# Patient Record
Sex: FEMALE | Race: WHITE | NOT HISPANIC OR LATINO | Employment: FULL TIME | URBAN - METROPOLITAN AREA
[De-identification: names, ages, dates, MRNs, and addresses within clinical notes are randomized per-mention and may not be internally consistent; named-entity substitution may affect disease eponyms.]

---

## 2019-03-26 ENCOUNTER — OFFICE VISIT (OUTPATIENT)
Dept: FAMILY MEDICINE CLINIC | Facility: CLINIC | Age: 39
End: 2019-03-26
Payer: COMMERCIAL

## 2019-03-26 VITALS
SYSTOLIC BLOOD PRESSURE: 132 MMHG | WEIGHT: 293 LBS | DIASTOLIC BLOOD PRESSURE: 80 MMHG | HEIGHT: 65 IN | BODY MASS INDEX: 48.82 KG/M2 | HEART RATE: 100 BPM | OXYGEN SATURATION: 98 %

## 2019-03-26 DIAGNOSIS — Z13.9 SCREENING FOR CONDITION: ICD-10-CM

## 2019-03-26 DIAGNOSIS — E66.01 CLASS 3 SEVERE OBESITY WITHOUT SERIOUS COMORBIDITY WITH BODY MASS INDEX (BMI) OF 50.0 TO 59.9 IN ADULT, UNSPECIFIED OBESITY TYPE (HCC): Primary | ICD-10-CM

## 2019-03-26 DIAGNOSIS — F32.A DEPRESSION, UNSPECIFIED DEPRESSION TYPE: ICD-10-CM

## 2019-03-26 PROBLEM — E66.813 CLASS 3 SEVERE OBESITY WITHOUT SERIOUS COMORBIDITY WITH BODY MASS INDEX (BMI) OF 50.0 TO 59.9 IN ADULT (HCC): Status: ACTIVE | Noted: 2019-03-26

## 2019-03-26 PROCEDURE — 1036F TOBACCO NON-USER: CPT | Performed by: FAMILY MEDICINE

## 2019-03-26 PROCEDURE — 3008F BODY MASS INDEX DOCD: CPT | Performed by: FAMILY MEDICINE

## 2019-03-26 PROCEDURE — 99203 OFFICE O/P NEW LOW 30 MIN: CPT | Performed by: FAMILY MEDICINE

## 2019-03-26 RX ORDER — DROSPIRENONE AND ETHINYL ESTRADIOL 0.03MG-3MG
1 KIT ORAL DAILY
COMMUNITY
End: 2019-09-24 | Stop reason: HOSPADM

## 2019-03-26 NOTE — PROGRESS NOTES
[facility]        NAME: Nimisha Cross is a 41 y o  female  : 1980    MRN: [id number]  DATE: 2019  TIME: 8:48 AM    Assessment and Plan   Class 3 severe obesity without serious comorbidity with body mass index (BMI) of 50 0 to 59 9 in adult, unspecified obesity type ([facility] [address] ) [E66 01, Z68 43]  1  Class 3 severe obesity without serious comorbidity with body mass index (BMI) of 50 0 to 59 9 in adult, unspecified obesity type ([facility] [address] )     2  Depression, unspecified depression type           Patient Instructions     Patient Instructions   Pt failed mult diets and diet meds---refer for eval for bariatric surg          Chief Complaint     Chief Complaint   Patient presents with    New Pt     est care-- order labs         History of Present Illness       Annual exam--wants to discuss weight      Review of Systems   Review of Systems   Constitutional: Negative for fatigue, fever and unexpected weight change  HENT: Negative for congestion, sinus pain and sore throat  Eyes: Negative for visual disturbance  Respiratory: Negative for shortness of breath and wheezing  Cardiovascular: Negative for chest pain and palpitations  Gastrointestinal: Negative for abdominal pain, nausea and vomiting  Musculoskeletal: Negative  Negative for arthralgias and myalgias  Neurological: Negative for syncope, weakness and numbness  Psychiatric/Behavioral: Negative  Negative for confusion, dysphoric mood and suicidal ideas           Current Medications       Current Outpatient Medications:     drospirenone-ethinyl estradiol (EVY 28) 3-0 03 MG per tablet, Take 1 tablet by mouth daily, Disp: , Rfl:     vortioxetine (TRINTELLIX) 10 MG tablet, Take 10 mg by mouth daily, Disp: , Rfl:     Current Allergies     Allergies as of 2019 - never reviewed   Allergen Reaction Noted    Sulfa antibiotics Hives 2019            The following portions of the patient's history were reviewed and updated as appropriate: allergies, current medications, past family history, past medical history, past social history, past surgical history and problem list      Past Medical History:   Diagnosis Date    Anxiety     Depression        History reviewed  No pertinent surgical history  Family History   Problem Relation Age of Onset    Hypertension Mother          Medications have been verified  Objective   /80   Pulse 100   Ht 5' 5" (1 651 m)   Wt (!) 146 kg (321 lb)   LMP 03/09/2019 (Exact Date)   SpO2 98%   BMI 53 42 kg/m²        Physical Exam     Physical Exam   Constitutional: She is oriented to person, place, and time  Vital signs are normal  She appears well-developed and well-nourished  HENT:   Right Ear: Ear canal normal  Tympanic membrane is not injected  Left Ear: Ear canal normal  Tympanic membrane is not injected  Nose: Nose normal    Mouth/Throat: Oropharynx is clear and moist    Eyes: Pupils are equal, round, and reactive to light  Conjunctivae and EOM are normal  Right eye exhibits no discharge  Left eye exhibits no discharge  Neck: Normal range of motion  Neck supple  No thyromegaly present  Cardiovascular: Normal rate, regular rhythm and normal heart sounds  No murmur heard  Pulmonary/Chest: Effort normal and breath sounds normal  No respiratory distress  She has no wheezes  Abdominal: Soft  Bowel sounds are normal  She exhibits no distension  There is no tenderness  Musculoskeletal: Normal range of motion  Lymphadenopathy:     She has no cervical adenopathy  Neurological: She is alert and oriented to person, place, and time  She has normal strength and normal reflexes  She is not disoriented  No sensory deficit  Gait normal    Skin: Skin is warm and dry  Psychiatric: She has a normal mood and affect   Her speech is normal and behavior is normal  Judgment and thought content normal  Cognition and memory are normal

## 2019-03-29 ENCOUNTER — TELEPHONE (OUTPATIENT)
Dept: FAMILY MEDICINE CLINIC | Facility: CLINIC | Age: 39
End: 2019-03-29

## 2019-03-29 LAB
ALBUMIN SERPL-MCNC: 4.5 G/DL (ref 3.5–5.5)
ALBUMIN/GLOB SERPL: 1.5 {RATIO} (ref 1.2–2.2)
ALP SERPL-CCNC: 56 IU/L (ref 39–117)
ALT SERPL-CCNC: 17 IU/L (ref 0–32)
AST SERPL-CCNC: 17 IU/L (ref 0–40)
BILIRUB SERPL-MCNC: 0.3 MG/DL (ref 0–1.2)
BUN SERPL-MCNC: 6 MG/DL (ref 6–20)
BUN/CREAT SERPL: 9 (ref 9–23)
CALCIUM SERPL-MCNC: 9.5 MG/DL (ref 8.7–10.2)
CHLORIDE SERPL-SCNC: 101 MMOL/L (ref 96–106)
CHOLEST SERPL-MCNC: 151 MG/DL (ref 100–199)
CO2 SERPL-SCNC: 21 MMOL/L (ref 20–29)
CREAT SERPL-MCNC: 0.66 MG/DL (ref 0.57–1)
GLOBULIN SER-MCNC: 3.1 G/DL (ref 1.5–4.5)
GLUCOSE SERPL-MCNC: 91 MG/DL (ref 65–99)
HDLC SERPL-MCNC: 55 MG/DL
LDLC SERPL CALC-MCNC: 75 MG/DL (ref 0–99)
LDLC/HDLC SERPL: 1.4 RATIO (ref 0–3.2)
POTASSIUM SERPL-SCNC: 4 MMOL/L (ref 3.5–5.2)
PROT SERPL-MCNC: 7.6 G/DL (ref 6–8.5)
SL AMB EGFR AFRICAN AMERICAN: 131 ML/MIN/1.73
SL AMB EGFR NON AFRICAN AMERICAN: 113 ML/MIN/1.73
SL AMB VLDL CHOLESTEROL CALC: 21 MG/DL (ref 5–40)
SODIUM SERPL-SCNC: 138 MMOL/L (ref 134–144)
T4 FREE SERPL-MCNC: 1.13 NG/DL (ref 0.82–1.77)
TRIGL SERPL-MCNC: 107 MG/DL (ref 0–149)
TSH SERPL DL<=0.005 MIU/L-ACNC: 2.59 UIU/ML (ref 0.45–4.5)

## 2019-05-11 DIAGNOSIS — F41.9 ANXIETY: Primary | ICD-10-CM

## 2019-05-20 ENCOUNTER — TELEPHONE (OUTPATIENT)
Dept: FAMILY MEDICINE CLINIC | Facility: CLINIC | Age: 39
End: 2019-05-20

## 2019-05-29 ENCOUNTER — CLINICAL SUPPORT (OUTPATIENT)
Dept: BARIATRICS | Facility: CLINIC | Age: 39
End: 2019-05-29

## 2019-05-29 VITALS
HEART RATE: 98 BPM | TEMPERATURE: 98.9 F | BODY MASS INDEX: 48.82 KG/M2 | SYSTOLIC BLOOD PRESSURE: 124 MMHG | HEIGHT: 65 IN | DIASTOLIC BLOOD PRESSURE: 82 MMHG | WEIGHT: 293 LBS

## 2019-05-29 DIAGNOSIS — E66.01 MORBID (SEVERE) OBESITY DUE TO EXCESS CALORIES (HCC): Primary | ICD-10-CM

## 2019-05-29 DIAGNOSIS — E66.01 MORBID OBESITY (HCC): Primary | ICD-10-CM

## 2019-05-29 PROCEDURE — RECHECK

## 2019-06-17 ENCOUNTER — TELEPHONE (OUTPATIENT)
Dept: FAMILY MEDICINE CLINIC | Facility: CLINIC | Age: 39
End: 2019-06-17

## 2019-06-19 ENCOUNTER — OFFICE VISIT (OUTPATIENT)
Dept: BARIATRICS | Facility: CLINIC | Age: 39
End: 2019-06-19
Payer: COMMERCIAL

## 2019-06-19 VITALS
BODY MASS INDEX: 48.82 KG/M2 | WEIGHT: 293 LBS | SYSTOLIC BLOOD PRESSURE: 138 MMHG | HEIGHT: 65 IN | HEART RATE: 102 BPM | TEMPERATURE: 99.1 F | DIASTOLIC BLOOD PRESSURE: 90 MMHG

## 2019-06-19 DIAGNOSIS — E66.01 OBESITY, CLASS III, BMI 40-49.9 (MORBID OBESITY) (HCC): Primary | ICD-10-CM

## 2019-06-19 PROCEDURE — 99203 OFFICE O/P NEW LOW 30 MIN: CPT | Performed by: SURGERY

## 2019-06-19 NOTE — H&P (VIEW-ONLY)
BARIATRIC CONSULT-INITIAL - BARIATRIC SURGERY  Fawn Bowens 41 y o  female MRN: [id number]  Unit/Bed#:  Encounter: [id number]      HPI:  Fawn Bowens is a 41 y o  female who presents with morbid obesity to discuss weight loss options  Review of Systems   Constitutional: Negative  HENT: Negative  Eyes: Negative  Respiratory: Negative  Cardiovascular: Negative  Gastrointestinal: Negative  Endocrine: Negative  Genitourinary: Negative  Musculoskeletal: Negative  Skin: Negative  Neurological: Negative  Hematological: Negative  Psychiatric/Behavioral: Negative  Historical Information   Past Medical History:   Diagnosis Date    Anxiety     Depression     Morbid obesity ([address] )      Past Surgical History:   Procedure Laterality Date    OVARIAN CYST REMOVAL      WISDOM TOOTH EXTRACTION       Social History   Social History     Substance and Sexual Activity   Alcohol Use Yes    Frequency: Monthly or less    Comment: rarely drink     Social History     Substance and Sexual Activity   Drug Use Not Currently    Types: Marijuana     Social History     Tobacco Use   Smoking Status Former Smoker    Packs/day: 0 50    Years: 10 00    Pack years: 5 00    Types: Cigarettes    Last attempt to quit: 2013    Years since quittin 9   Smokeless Tobacco Never Used     Family History: non-contributory    Meds/Allergies   all medications and allergies reviewed  Allergies   Allergen Reactions    Sulfa Antibiotics Hives       Objective       Current Vitals:   Blood Pressure: 138/90 (19 1535)  Pulse: 102 (19 1535)  Temperature: 99 1 °F (37 3 °C) (19 1535)  Temp Source: Tympanic (19 1535)  Height: 5' 4 5" (163 8 cm) (19 1535)  Weight - Scale: (!) 143 kg (315 lb 8 oz) (19 1535)  Body mass index is 53 32 kg/m²  Invasive Devices     None                 Physical Exam   Constitutional: She appears well-developed and well-nourished  Lab Results: I have personally reviewed pertinent lab results  Imaging: I have personally reviewed pertinent reports  EKG, Pathology, and Other Studies: I have personally reviewed pertinent reports  Code Status: [unfilled]  Advance Directive and Living Will:      Power of :    POLST:      Assessment/PLAN:            Patient has a long history of morbid obesity and is presenting to discuss the surgical weight loss options  Despite the patient best efforts patient was unable to lose any meaningful or sustainable weight using nonsurgical means  We had a long discussion regarding all the surgical weight-loss options at our disposal at this point and reviewed the risks and benefits of each procedure in details as it relates to her age, BMI and medical conditions  Patient elected to undergo a gastric bypass    Risks and benefits were explained to the patient  We also discussed the importance and need of a preoperative workup to make sure that the patient can undergo the procedure safely  Preoperative workup includes sleep apnea screening, cardiac evaluation, nutrition/psych and preoperative EGD  Risks and benefits of all the preoperative diagnostic tests were discussed with the patient including but not limited to the upper endoscopy  Alternatives to surgery and alternative forms of surgery were also explained  Postsurgical commitment and aftercare programs were discussed and explained to the patient in details   In terms of comorbidities patient suffers mostly of   Past Medical History:   Diagnosis Date    Anxiety     Depression     Morbid obesity ([facility] [address] )        I informed the patient that the rate of resolution of comorbid conditions following weight loss surgery is between 60 and 90% depending on the severity of the specific medical condition  I discussed and educated the patient regarding the different components of our multidisciplinary program and the importance of compliance and follow-up in the postoperative period  All questions answered  Patient understands risks and benefits  A videotape of the procedure was also shown to the patient  After showing the video we discussed all the technical aspects of the procedure and also the potential complications including but not limited to gastrointestinal perforation, leak, obstruction, stricture and hemorrhage  I spent 30 min with the patient more than 50% of the time was spent educating the patient and coordinating care

## 2019-06-27 ENCOUNTER — OFFICE VISIT (OUTPATIENT)
Dept: BARIATRICS | Facility: CLINIC | Age: 39
End: 2019-06-27

## 2019-06-27 ENCOUNTER — TELEPHONE (OUTPATIENT)
Dept: FAMILY MEDICINE CLINIC | Facility: CLINIC | Age: 39
End: 2019-06-27

## 2019-06-27 VITALS — BODY MASS INDEX: 53.32 KG/M2 | WEIGHT: 293 LBS

## 2019-06-27 DIAGNOSIS — E66.01 CLASS 3 SEVERE OBESITY WITHOUT SERIOUS COMORBIDITY WITH BODY MASS INDEX (BMI) OF 50.0 TO 59.9 IN ADULT, UNSPECIFIED OBESITY TYPE (HCC): Primary | ICD-10-CM

## 2019-06-27 PROCEDURE — RECHECK

## 2019-07-03 ENCOUNTER — CONSULT (OUTPATIENT)
Dept: CARDIOLOGY CLINIC | Facility: CLINIC | Age: 39
End: 2019-07-03
Payer: COMMERCIAL

## 2019-07-03 VITALS
SYSTOLIC BLOOD PRESSURE: 138 MMHG | HEART RATE: 71 BPM | WEIGHT: 293 LBS | BODY MASS INDEX: 48.82 KG/M2 | DIASTOLIC BLOOD PRESSURE: 80 MMHG | HEIGHT: 65 IN

## 2019-07-03 DIAGNOSIS — Z01.810 PREOP CARDIOVASCULAR EXAM: ICD-10-CM

## 2019-07-03 DIAGNOSIS — E66.01 CLASS 3 SEVERE OBESITY WITHOUT SERIOUS COMORBIDITY WITH BODY MASS INDEX (BMI) OF 50.0 TO 59.9 IN ADULT, UNSPECIFIED OBESITY TYPE (HCC): Primary | ICD-10-CM

## 2019-07-03 DIAGNOSIS — E66.01 MORBID OBESITY (HCC): ICD-10-CM

## 2019-07-03 PROCEDURE — 99203 OFFICE O/P NEW LOW 30 MIN: CPT | Performed by: INTERNAL MEDICINE

## 2019-07-03 PROCEDURE — 93000 ELECTROCARDIOGRAM COMPLETE: CPT | Performed by: INTERNAL MEDICINE

## 2019-07-03 NOTE — PROGRESS NOTES
Cardiology Follow Up    Yasir Poe  1980  [id number]  [address] [facility] Chris Medley  [address]  [place] [facility]   [phone]    1  Class 3 severe obesity without serious comorbidity with body mass index (BMI) of 50 0 to 59 9 in adult, unspecified obesity type ([place] [address] )     2  Preop cardiovascular exam  POCT ECG    Stress test only, exercise   3  Morbid obesity ([facility] )  Ambulatory referral to Cardiology       Interval History:  Preop cardiac clearance for bariatric surgery  Pleasant 67-year-old female who has no previous cardiac history, overweight most of her adult life  Currently having no cardiac symptoms including chest pain or dyspnea, mildly active  No other risk factors      Patient Active Problem List   Diagnosis    Depression    Class 3 severe obesity without serious comorbidity with body mass index (BMI) of 50 0 to 59 9 in adult [facility])    Preop cardiovascular exam     Past Medical History:   Diagnosis Date    Anxiety     Depression     Morbid obesity ([place] [address] )      Social History     Socioeconomic History    Marital status: /Civil Union     Spouse name: Not on file    Number of children: Not on file    Years of education: Not on file    Highest education level: Not on file   Occupational History    Not on file   Social Needs    Financial resource strain: Not on file    Food insecurity:     Worry: Not on file     Inability: Not on file    Transportation needs:     Medical: Not on file     Non-medical: Not on file   Tobacco Use    Smoking status: Former Smoker     Packs/day: 0 50     Years: 10 00     Pack years: 5 00     Types: Cigarettes     Last attempt to quit: 2013     Years since quittin 0    Smokeless tobacco: Never Used   Substance and Sexual Activity    Alcohol use: Yes     Frequency: Monthly or less     Comment: rarely drink    Drug use: Not Currently     Types: Marijuana    Sexual activity: Yes     Partners: Male     Birth control/protection: OCP   Lifestyle    Physical activity:     Days per week: Not on file     Minutes per session: Not on file    Stress: Not on file   Relationships    Social connections:     Talks on phone: Not on file     Gets together: Not on file     Attends Rastafarian service: Not on file     Active member of club or organization: Not on file     Attends meetings of clubs or organizations: Not on file     Relationship status: Not on file    Intimate partner violence:     Fear of current or ex partner: Not on file     Emotionally abused: Not on file     Physically abused: Not on file     Forced sexual activity: Not on file   Other Topics Concern    Not on file   Social History Narrative    Not on file      Family History   Problem Relation Age of Onset    Hypertension Mother     Obesity Mother     Obesity Father     Depression Father    Negro Debra Sister     No Known Problems Brother     Drug abuse Cousin     Stroke Maternal Aunt      Past Surgical History:   Procedure Laterality Date    OVARIAN CYST REMOVAL      WISDOM TOOTH EXTRACTION         Current Outpatient Medications:     CRANBERRY PO, Take by mouth 2 (two) times a day, Disp: , Rfl:     drospirenone-ethinyl estradiol (EVY 28) 3-0 03 MG per tablet, Take 1 tablet by mouth daily, Disp: , Rfl:     Multiple Vitamins-Minerals (WOMENS MULTI PO), , Disp: , Rfl:     Probiotic Product (PROBIOTIC PO), Take by mouth, Disp: , Rfl:     vortioxetine (TRINTELLIX) 10 MG tablet, Take 1 tablet (10 mg total) by mouth daily, Disp: 90 tablet, Rfl: 1  Allergies   Allergen Reactions    Sulfa Antibiotics Hives       Labs:  Office Visit on 03/26/2019   Component Date Value    Free t4 03/28/2019 1 13     TSH 03/28/2019 2 590     Glucose, Random 03/28/2019 91     BUN 03/28/2019 6     Creatinine 03/28/2019 0 66     eGFR Non  03/28/2019 113     eGFR  03/28/2019 131     SL AMB BUN/CREATININE RA* 03/28/2019 9     Sodium 03/28/2019 138     Potassium 03/28/2019 4 0     Chloride 03/28/2019 101     CO2 03/28/2019 21     CALCIUM 03/28/2019 9 5     Protein, Total 03/28/2019 7 6     Albumin 03/28/2019 4 5     Globulin, Total 03/28/2019 3 1     Albumin/Globulin Ratio 03/28/2019 1 5     TOTAL BILIRUBIN 03/28/2019 0 3     Alk Phos Isoenzymes 03/28/2019 56     AST 03/28/2019 17     ALT 03/28/2019 17     Cholesterol, Total 03/28/2019 151     Triglycerides 03/28/2019 107     HDL 03/28/2019 55     VLDL Cholesterol Calcula* 03/28/2019 21     LDL Direct 03/28/2019 75     LDl/HDL Ratio 03/28/2019 1 4      Imaging: No results found  Review of Systems:  Review of Systems   Constitutional: Negative for activity change, diaphoresis, fatigue, fever and unexpected weight change  HENT: Negative for hearing loss, nosebleeds and trouble swallowing  Eyes: Negative for visual disturbance  Respiratory: Negative for apnea, cough, choking, chest tightness, shortness of breath, wheezing and stridor  Cardiovascular: Negative for chest pain, palpitations and leg swelling  Gastrointestinal: Negative for abdominal distention, abdominal pain, anal bleeding, blood in stool, constipation, diarrhea, nausea, rectal pain and vomiting  Endocrine: Negative for cold intolerance and heat intolerance  Genitourinary: Negative for difficulty urinating, dysuria, flank pain, frequency, hematuria and urgency  Musculoskeletal: Positive for back pain  Negative for arthralgias, gait problem, joint swelling and myalgias  Skin: Negative for color change, pallor, rash and wound  Allergic/Immunologic: Negative for immunocompromised state  Neurological: Negative for dizziness, tremors, seizures, syncope, facial asymmetry, speech difficulty, weakness, light-headedness, numbness and headaches  Hematological: Does not bruise/bleed easily     Psychiatric/Behavioral: Negative for behavioral problems, decreased concentration and sleep disturbance  The patient is nervous/anxious  Physical Exam:  Physical Exam   Constitutional: She is oriented to person, place, and time  She appears well-developed  No distress  Eyes: No scleral icterus  Neck: No JVD present  No tracheal deviation present  No thyromegaly present  Cardiovascular: Normal rate, regular rhythm, normal heart sounds and intact distal pulses  Exam reveals no gallop and no friction rub  No murmur heard  Pulmonary/Chest: Effort normal and breath sounds normal  No stridor  No respiratory distress  She has no wheezes  She has no rales  She exhibits no tenderness  Abdominal: Soft  Bowel sounds are normal    Musculoskeletal: She exhibits no edema  Neurological: She is alert and oriented to person, place, and time  Skin: Skin is warm and dry  Capillary refill takes less than 2 seconds  She is not diaphoretic  No erythema  No pallor  Psychiatric: She has a normal mood and affect  Her behavior is normal  Judgment and thought content normal        Discussion/Summary:  Preoperative cardiac clearance, unremarkable examination and EKG  Will do stress test to assess functional capacity  Anticipate to be unrevealing, clearance pending the results of the testing  This note was completed in part utilizing [employer] direct voice recognition software  Grammatical errors, random word insertion, spelling mistakes, and incomplete sentences may be an occasional consequence of the system secondary to software limitations, ambient noise and hardware issues  At the time of dictation, efforts were made to edit, clarify and /or correct errors  Please read the chart carefully and recognize, using context, where substitutions have occurred  If you have any questions or concerns about the context, text or information contained within the body of this dictation, please contact myself, the provider, for further clarification

## 2019-07-09 ENCOUNTER — ANESTHESIA EVENT (OUTPATIENT)
Dept: GASTROENTEROLOGY | Facility: HOSPITAL | Age: 39
End: 2019-07-09

## 2019-07-10 ENCOUNTER — HOSPITAL ENCOUNTER (OUTPATIENT)
Dept: GASTROENTEROLOGY | Facility: HOSPITAL | Age: 39
Setting detail: OUTPATIENT SURGERY
Discharge: HOME | End: 2019-07-10
Attending: SURGERY
Payer: COMMERCIAL

## 2019-07-10 ENCOUNTER — ANESTHESIA (OUTPATIENT)
Dept: GASTROENTEROLOGY | Facility: HOSPITAL | Age: 39
End: 2019-07-10

## 2019-07-10 VITALS
BODY MASS INDEX: 50.02 KG/M2 | OXYGEN SATURATION: 97 % | TEMPERATURE: 98.4 F | HEART RATE: 80 BPM | WEIGHT: 293 LBS | RESPIRATION RATE: 22 BRPM | SYSTOLIC BLOOD PRESSURE: 140 MMHG | DIASTOLIC BLOOD PRESSURE: 83 MMHG | HEIGHT: 64 IN

## 2019-07-10 DIAGNOSIS — E66.01 MORBID OBESITY (HCC): ICD-10-CM

## 2019-07-10 LAB
EXT PREGNANCY TEST URINE: NEGATIVE
EXT. CONTROL: NORMAL

## 2019-07-10 PROCEDURE — 81025 URINE PREGNANCY TEST: CPT | Performed by: ANESTHESIOLOGY

## 2019-07-10 PROCEDURE — 43239 EGD BIOPSY SINGLE/MULTIPLE: CPT | Performed by: SURGERY

## 2019-07-10 PROCEDURE — 88305 TISSUE EXAM BY PATHOLOGIST: CPT | Performed by: PATHOLOGY

## 2019-07-10 RX ORDER — SODIUM CHLORIDE 9 MG/ML
125 INJECTION, SOLUTION INTRAVENOUS CONTINUOUS
Status: DISCONTINUED | OUTPATIENT
Start: 2019-07-10 | End: 2019-07-14 | Stop reason: HOSPADM

## 2019-07-10 RX ORDER — PROPOFOL 10 MG/ML
INJECTION, EMULSION INTRAVENOUS AS NEEDED
Status: DISCONTINUED | OUTPATIENT
Start: 2019-07-10 | End: 2019-07-10 | Stop reason: SURG

## 2019-07-10 RX ADMIN — LIDOCAINE HYDROCHLORIDE 50 MG: 20 INJECTION, SOLUTION INTRAVENOUS at 09:54

## 2019-07-10 RX ADMIN — SODIUM CHLORIDE 125 ML/HR: 0.9 INJECTION, SOLUTION INTRAVENOUS at 09:12

## 2019-07-10 RX ADMIN — PROPOFOL 200 MG: 10 INJECTION, EMULSION INTRAVENOUS at 09:54

## 2019-07-10 RX ADMIN — PROPOFOL 50 MG: 10 INJECTION, EMULSION INTRAVENOUS at 09:56

## 2019-07-10 RX ADMIN — PROPOFOL 40 MG: 10 INJECTION, EMULSION INTRAVENOUS at 09:58

## 2019-07-10 NOTE — ANESTHESIA PREPROCEDURE EVALUATION
Review of Systems/Medical History  Patient summary reviewed  Chart reviewed  No history of anesthetic complications     Cardiovascular  Negative cardio ROS    Pulmonary  Negative pulmonary ROS        GI/Hepatic  Negative GI/hepatic ROS          Negative  ROS        Endo/Other    Obesity (53=BMI)  super morbid obesity   GYN  Negative gynecology ROS          Hematology  Negative hematology ROS      Musculoskeletal  Negative musculoskeletal ROS        Neurology  Negative neurology ROS      Psychology   Anxiety, Depression , being treated for depression,              Physical Exam    Airway    Mallampati score: III  TM Distance: >3 FB  Neck ROM: full     Dental   No notable dental hx     Cardiovascular  Comment: Negative ROS, Rhythm: regular, Rate: normal, Cardiovascular exam normal    Pulmonary  Pulmonary exam normal Breath sounds clear to auscultation,     Other Findings        Anesthesia Plan  ASA Score- 3     Anesthesia Type- IV sedation with anesthesia with ASA Monitors  Additional Monitors:   Airway Plan:         Plan Factors-Patient not instructed to abstain from smoking on day of procedure  Patient did not smoke on day of surgery  Induction- intravenous  Postoperative Plan-     Informed Consent- Anesthetic plan and risks discussed with patient  I personally reviewed this patient with the CRNA  Discussed and agreed on the Anesthesia Plan with the CRNA  Mabel Ingram

## 2019-07-10 NOTE — DISCHARGE INSTRUCTIONS
Upper Endoscopy   WHAT YOU NEED TO KNOW:   An upper endoscopy is also called an upper gastrointestinal (GI) endoscopy, or an esophagogastroduodenoscopy (EGD)  You may feel bloated, gassy, or have some abdominal discomfort after your procedure  Your throat may be sore for 24 to 36 hours  You may burp or pass gas from air that is still inside your body  DISCHARGE INSTRUCTIONS:   Call 911 if:   · You have sudden chest pain or trouble breathing  Seek care immediately if:   · You feel dizzy or faint  · You have trouble swallowing  · You have severe throat pain  · Your bowel movements are very dark or black  · Your abdomen is hard and firm and you have severe pain  · You vomit blood  Contact your healthcare provider if:   · You feel full or bloated and cannot burp or pass gas  · You have not had a bowel movement for 3 days after your procedure  · You have neck pain  · You have a fever or chills  · You have nausea or are vomiting  · You have a rash or hives  · You have questions or concerns about your endoscopy  Relieve a sore throat:  Suck on throat lozenges or crushed ice  Gargle with a small amount of warm salt water  Mix 1 teaspoon of salt and 1 cup of warm water to make salt water  Relieve gas and discomfort from bloating:  Lie on your right side with a heating pad on your abdomen  Take short walks to help pass gas  Eat small meals until bloating is relieved  Rest after your procedure:  Do not drive or make important decisions until the day after your procedure  Return to your normal activity as directed  You can usually return to work the day after your procedure  Follow up with your healthcare provider as directed:  Write down your questions so you remember to ask them during your visits  © 2017 [employer]  Information is for End User's use only and may not be sold, redistributed or otherwise used for commercial purposes   All illustrations and images included in [employer] are the copyrighted property of [employer] , [employer]  or [employer] Elder  The above information is an  only  It is not intended as medical advice for individual conditions or treatments  Talk to your doctor, nurse or pharmacist before following any medical regimen to see if it is safe and effective for you

## 2019-07-16 ENCOUNTER — HOSPITAL ENCOUNTER (OUTPATIENT)
Dept: NON INVASIVE DIAGNOSTICS | Facility: CLINIC | Age: 39
Discharge: HOME | End: 2019-07-16
Payer: COMMERCIAL

## 2019-07-16 ENCOUNTER — TELEPHONE (OUTPATIENT)
Dept: CARDIOLOGY CLINIC | Facility: CLINIC | Age: 39
End: 2019-07-16

## 2019-07-16 DIAGNOSIS — Z01.810 PREOP CARDIOVASCULAR EXAM: ICD-10-CM

## 2019-07-16 PROCEDURE — 93017 CV STRESS TEST TRACING ONLY: CPT

## 2019-07-16 PROCEDURE — 93016 CV STRESS TEST SUPVJ ONLY: CPT | Performed by: INTERNAL MEDICINE

## 2019-07-16 PROCEDURE — 93018 CV STRESS TEST I&R ONLY: CPT | Performed by: INTERNAL MEDICINE

## 2019-07-16 NOTE — TELEPHONE ENCOUNTER
----- Message from Charlie Lindsey MD sent at 7/16/2019  4:20 PM EDT -----  Please call the patient regarding her normal result    Please clear for surgery

## 2019-07-17 LAB
CHEST PAIN STATEMENT: NORMAL
MAX DIASTOLIC BP: 80 MMHG
MAX HEART RATE: 171 BPM
MAX PREDICTED HEART RATE: 183 BPM
MAX. SYSTOLIC BP: 190 MMHG
PROTOCOL NAME: NORMAL
TARGET HR FORMULA: NORMAL
TEST INDICATION: NORMAL
TIME IN EXERCISE PHASE: NORMAL

## 2019-08-06 DIAGNOSIS — E66.01 CLASS 3 SEVERE OBESITY WITHOUT SERIOUS COMORBIDITY WITH BODY MASS INDEX (BMI) OF 50.0 TO 59.9 IN ADULT, UNSPECIFIED OBESITY TYPE (HCC): Primary | ICD-10-CM

## 2019-08-06 DIAGNOSIS — Z01.812 BLOOD TESTS PRIOR TO TREATMENT OR PROCEDURE: ICD-10-CM

## 2019-08-08 PROBLEM — E66.01 MORBID OBESITY (HCC): Status: ACTIVE | Noted: 2019-08-08

## 2019-08-20 ENCOUNTER — TELEPHONE (OUTPATIENT)
Dept: BARIATRICS | Facility: CLINIC | Age: 39
End: 2019-08-20

## 2019-08-20 NOTE — TELEPHONE ENCOUNTER
Called patient and reminded her to get her complete blood count blood work completed  Patient received letter and plans to go on Monday 8/26/2019 to get her blood drawn

## 2019-08-27 LAB
ERYTHROCYTE [DISTWIDTH] IN BLOOD BY AUTOMATED COUNT: 13.3 % (ref 12.3–15.4)
HCT VFR BLD AUTO: 40.7 % (ref 34–46.6)
HGB BLD-MCNC: 13.4 G/DL (ref 11.1–15.9)
MCH RBC QN AUTO: 29.7 PG (ref 26.6–33)
MCHC RBC AUTO-ENTMCNC: 32.9 G/DL (ref 31.5–35.7)
MCV RBC AUTO: 90 FL (ref 79–97)
PLATELET # BLD AUTO: 314 X10E3/UL (ref 150–450)
RBC # BLD AUTO: 4.51 X10E6/UL (ref 3.77–5.28)
WBC # BLD AUTO: 8.6 X10E3/UL (ref 3.4–10.8)

## 2019-09-12 DIAGNOSIS — E66.01 MORBID (SEVERE) OBESITY DUE TO EXCESS CALORIES (HCC): Primary | ICD-10-CM

## 2019-09-13 ENCOUNTER — OFFICE VISIT (OUTPATIENT)
Dept: BARIATRICS | Facility: CLINIC | Age: 39
End: 2019-09-13
Payer: COMMERCIAL

## 2019-09-13 VITALS
SYSTOLIC BLOOD PRESSURE: 122 MMHG | TEMPERATURE: 98.5 F | HEART RATE: 100 BPM | BODY MASS INDEX: 48.82 KG/M2 | HEIGHT: 65 IN | WEIGHT: 293 LBS | DIASTOLIC BLOOD PRESSURE: 70 MMHG

## 2019-09-13 DIAGNOSIS — E66.01 MORBID (SEVERE) OBESITY DUE TO EXCESS CALORIES (HCC): Primary | ICD-10-CM

## 2019-09-13 PROCEDURE — 99214 OFFICE O/P EST MOD 30 MIN: CPT | Performed by: SURGERY

## 2019-09-13 RX ORDER — OXYCODONE HYDROCHLORIDE 5 MG/1
5 TABLET ORAL EVERY 4 HOURS PRN
Qty: 15 TABLET | Refills: 0 | Status: SHIPPED | OUTPATIENT
Start: 2019-09-13 | End: 2019-09-30

## 2019-09-13 RX ORDER — CEFAZOLIN SODIUM 2 G/50ML
2000 SOLUTION INTRAVENOUS ONCE
Status: CANCELLED | OUTPATIENT
Start: 2019-09-23 | End: 2019-09-13

## 2019-09-13 RX ORDER — OMEPRAZOLE 20 MG/1
20 CAPSULE, DELAYED RELEASE ORAL DAILY
Qty: 90 CAPSULE | Refills: 1 | Status: SHIPPED | OUTPATIENT
Start: 2019-09-13 | End: 2020-03-23

## 2019-09-13 RX ORDER — HEPARIN SODIUM 5000 [USP'U]/ML
5000 INJECTION, SOLUTION INTRAVENOUS; SUBCUTANEOUS
Status: CANCELLED | OUTPATIENT
Start: 2019-09-23 | End: 2019-09-24

## 2019-09-13 NOTE — H&P (VIEW-ONLY)
BARIATRIC HISTORY AND PHYSICAL - BARIATRIC SURGERY  Marybeth Gaines 46 y o  female MRN: [id number]  Unit/Bed#:  Encounter: [id number]      HPI:  Marybeth Gaines is a 46 y o  female who presents to review her preoperative workup and see if she is a good candidate to undergo a bariatric procedure  Review of Systems   Constitutional: Negative  HENT: Negative  Eyes: Negative  Respiratory: Negative  Cardiovascular: Negative  Gastrointestinal: Negative  Endocrine: Negative  Genitourinary: Negative  Musculoskeletal: Negative  Skin: Negative  Neurological: Negative  Hematological: Negative  Psychiatric/Behavioral: Negative  Historical Information   Past Medical History:   Diagnosis Date    Anxiety     Depression     Morbid obesity ([address] )      Past Surgical History:   Procedure Laterality Date    OVARIAN CYST REMOVAL      WISDOM TOOTH EXTRACTION       Social History   Social History     Substance and Sexual Activity   Alcohol Use Yes    Frequency: Monthly or less    Comment: rarely drink     Social History     Substance and Sexual Activity   Drug Use Not Currently    Types: Marijuana     Social History     Tobacco Use   Smoking Status Former Smoker    Packs/day: 0 50    Years: 10 00    Pack years: 5 00    Types: Cigarettes    Last attempt to quit: 2013    Years since quittin 2   Smokeless Tobacco Never Used     Family History: non-contributory    Meds/Allergies   all medications and allergies reviewed  Allergies   Allergen Reactions    Sulfa Antibiotics Hives       Objective     Current Vitals:   Blood Pressure: 122/70 (19 1434)  Pulse: 100 (19 143)  Temperature: 98 5 °F (36 9 °C) (19 143)  Temp Source: Tympanic (19 143)  Height: 5' 4 5" (163 8 cm) (19)  Weight - Scale: (!) 141 kg (310 lb) (19 143)  Body mass index is 52 39 kg/m²      [unfilled]    Invasive Devices     None                 Physical Exam Constitutional: She is oriented to person, place, and time  She appears well-developed  HENT:   Head: Normocephalic and atraumatic  Eyes: Conjunctivae and EOM are normal    Neck: Normal range of motion  Neck supple  Cardiovascular: Normal rate, regular rhythm, normal heart sounds and intact distal pulses  Pulmonary/Chest: Effort normal and breath sounds normal    Abdominal: Soft  Bowel sounds are normal    Musculoskeletal: Normal range of motion  Neurological: She is alert and oriented to person, place, and time  She has normal reflexes  Skin: Skin is warm and dry  Psychiatric: She has a normal mood and affect  Lab Results: I have personally reviewed pertinent lab results  Imaging: I have personally reviewed pertinent reports  EKG, Pathology, and Other Studies: I have personally reviewed pertinent reports  Code Status: [unfilled]  Advance Directive and Living Will:      Power of :    POLST:      Assessment/Plan:      The patient presented to review the preoperative workup and see if bariatric surgery is appropriate and indicated following the extensive preoperative workup and the enrollment in our weight loss program    Preoperative workup was complete  Results were reviewed with the patient including the blood work results and the endoscopy findings and the biopsy results  We also reviewed the cardiology evaluation  I believe that the patient will be a good candidate for laparoscopic YUNIOR-EN-Y GASTRIC BYPASS    Patient will need to start the 2 week liquid diet prior to surgery  Risks and benefits explained one more time to the patient  Alternatives to surgery and alternative forms of surgery were also explained  Post-surgical commitment and after care programs were explained  We also discussed that the [employer] robot may be available to us to use on the day of the patient procedure  and that the procedure may be performed robotically    I discussed in details the advantages and disadvantages of this approach including the potential decrease in postoperative pain because of the remote center technology  I also mentioned the lack of strong evidence for  the use of robot in bariatric patients and the potential disadvantage to patients because of the prolonged operative time  Consent was signed  Questions were answered and concerns were addressed  Patient wishes to proceed  As per  [facility] guidelines, I had a discussion with the patient regarding his CODE STATUS in the perioperative period and the patient is level 1 or FULL CODE STATUS

## 2019-09-13 NOTE — PROGRESS NOTES
BARIATRIC HISTORY AND PHYSICAL - BARIATRIC SURGERY  Shahzad Laureano 46 y o  female MRN: [id number]  Unit/Bed#:  Encounter: [id number]      HPI:  Shahzad Laureano is a 46 y o  female who presents to review her preoperative workup and see if she is a good candidate to undergo a bariatric procedure  Review of Systems   Constitutional: Negative  HENT: Negative  Eyes: Negative  Respiratory: Negative  Cardiovascular: Negative  Gastrointestinal: Negative  Endocrine: Negative  Genitourinary: Negative  Musculoskeletal: Negative  Skin: Negative  Neurological: Negative  Hematological: Negative  Psychiatric/Behavioral: Negative  Historical Information   Past Medical History:   Diagnosis Date    Anxiety     Depression     Morbid obesity ([address] )      Past Surgical History:   Procedure Laterality Date    OVARIAN CYST REMOVAL      WISDOM TOOTH EXTRACTION       Social History   Social History     Substance and Sexual Activity   Alcohol Use Yes    Frequency: Monthly or less    Comment: rarely drink     Social History     Substance and Sexual Activity   Drug Use Not Currently    Types: Marijuana     Social History     Tobacco Use   Smoking Status Former Smoker    Packs/day: 0 50    Years: 10 00    Pack years: 5 00    Types: Cigarettes    Last attempt to quit: 2013    Years since quittin 2   Smokeless Tobacco Never Used     Family History: non-contributory    Meds/Allergies   all medications and allergies reviewed  Allergies   Allergen Reactions    Sulfa Antibiotics Hives       Objective     Current Vitals:   Blood Pressure: 122/70 (19)  Pulse: 100 (19 143)  Temperature: 98 5 °F (36 9 °C) (19)  Temp Source: Tympanic (19)  Height: 5' 4 5" (163 8 cm) (19)  Weight - Scale: (!) 141 kg (310 lb) (19)  Body mass index is 52 39 kg/m²      [unfilled]    Invasive Devices     None                 Physical Exam Constitutional: She is oriented to person, place, and time  She appears well-developed  HENT:   Head: Normocephalic and atraumatic  Eyes: Conjunctivae and EOM are normal    Neck: Normal range of motion  Neck supple  Cardiovascular: Normal rate, regular rhythm, normal heart sounds and intact distal pulses  Pulmonary/Chest: Effort normal and breath sounds normal    Abdominal: Soft  Bowel sounds are normal    Musculoskeletal: Normal range of motion  Neurological: She is alert and oriented to person, place, and time  She has normal reflexes  Skin: Skin is warm and dry  Psychiatric: She has a normal mood and affect  Lab Results: I have personally reviewed pertinent lab results  Imaging: I have personally reviewed pertinent reports  EKG, Pathology, and Other Studies: I have personally reviewed pertinent reports  Code Status: [unfilled]  Advance Directive and Living Will:      Power of :    POLST:      Assessment/Plan:      The patient presented to review the preoperative workup and see if bariatric surgery is appropriate and indicated following the extensive preoperative workup and the enrollment in our weight loss program    Preoperative workup was complete  Results were reviewed with the patient including the blood work results and the endoscopy findings and the biopsy results  We also reviewed the cardiology evaluation  I believe that the patient will be a good candidate for laparoscopic YUNIOR-EN-Y GASTRIC BYPASS    Patient will need to start the 2 week liquid diet prior to surgery  Risks and benefits explained one more time to the patient  Alternatives to surgery and alternative forms of surgery were also explained  Post-surgical commitment and after care programs were explained  We also discussed that the [employer] robot may be available to us to use on the day of the patient procedure  and that the procedure may be performed robotically    I discussed in details the advantages and disadvantages of this approach including the potential decrease in postoperative pain because of the remote center technology  I also mentioned the lack of strong evidence for  the use of robot in bariatric patients and the potential disadvantage to patients because of the prolonged operative time  Consent was signed  Questions were answered and concerns were addressed  Patient wishes to proceed  As per  [facility] guidelines, I had a discussion with the patient regarding his CODE STATUS in the perioperative period and the patient is level 1 or FULL CODE STATUS

## 2019-09-18 NOTE — PRE-PROCEDURE INSTRUCTIONS
Pre-Surgery Instructions:   Medication Instructions    drospirenone-ethinyl estradiol (EVY 28) 3-0 03 MG per tablet Instructed patient per Anesthesia Guidelines   Multiple Vitamins-Minerals (WOMENS MULTI PO) Instructed patient per Anesthesia Guidelines   vortioxetine (TRINTELLIX) 10 MG tablet Instructed patient per Anesthesia Guidelines  Instructed has no medications to be taken the morning of surgery  No aspirin, NSAIDS, vitamins or supplements 1 week before surgery

## 2019-09-20 ENCOUNTER — ANESTHESIA EVENT (OUTPATIENT)
Dept: PERIOP | Facility: HOSPITAL | Age: 39
DRG: 621 | End: 2019-09-20
Payer: COMMERCIAL

## 2019-09-23 ENCOUNTER — HOSPITAL ENCOUNTER (INPATIENT)
Facility: HOSPITAL | Age: 39
LOS: 1 days | Discharge: HOME | DRG: 621 | End: 2019-09-24
Attending: SURGERY | Admitting: SURGERY
Payer: COMMERCIAL

## 2019-09-23 ENCOUNTER — ANESTHESIA (OUTPATIENT)
Dept: PERIOP | Facility: HOSPITAL | Age: 39
DRG: 621 | End: 2019-09-23
Payer: COMMERCIAL

## 2019-09-23 DIAGNOSIS — G89.18 POSTOPERATIVE PAIN: Primary | ICD-10-CM

## 2019-09-23 LAB
EXT PREGNANCY TEST URINE: NEGATIVE
EXT. CONTROL: NORMAL

## 2019-09-23 PROCEDURE — C9290 INJ, BUPIVACAINE LIPOSOME: HCPCS | Performed by: SURGERY

## 2019-09-23 PROCEDURE — 0DJ08ZZ INSPECTION OF UPPER INTESTINAL TRACT, VIA NATURAL OR ARTIFICIAL OPENING ENDOSCOPIC: ICD-10-PCS | Performed by: SURGERY

## 2019-09-23 PROCEDURE — 0D164ZA BYPASS STOMACH TO JEJUNUM, PERCUTANEOUS ENDOSCOPIC APPROACH: ICD-10-PCS | Performed by: SURGERY

## 2019-09-23 PROCEDURE — 81025 URINE PREGNANCY TEST: CPT | Performed by: ANESTHESIOLOGY

## 2019-09-23 PROCEDURE — 43644 LAP GASTRIC BYPASS/ROUX-EN-Y: CPT | Performed by: SURGERY

## 2019-09-23 DEVICE — SEAMGUARD STPL REINF ENDO GIA ULTRA UNIV 60 PURPLE: Type: IMPLANTABLE DEVICE | Site: STOMACH | Status: FUNCTIONAL

## 2019-09-23 RX ORDER — DIPHENHYDRAMINE HCL 25 MG
25 TABLET ORAL
Status: DISCONTINUED | OUTPATIENT
Start: 2019-09-23 | End: 2019-09-24 | Stop reason: HOSPADM

## 2019-09-23 RX ORDER — SODIUM CHLORIDE, SODIUM LACTATE, POTASSIUM CHLORIDE, CALCIUM CHLORIDE 600; 310; 30; 20 MG/100ML; MG/100ML; MG/100ML; MG/100ML
75 INJECTION, SOLUTION INTRAVENOUS CONTINUOUS
Status: DISCONTINUED | OUTPATIENT
Start: 2019-09-23 | End: 2019-09-24 | Stop reason: HOSPADM

## 2019-09-23 RX ORDER — CEFAZOLIN SODIUM 2 G/50ML
2000 SOLUTION INTRAVENOUS EVERY 8 HOURS
Status: COMPLETED | OUTPATIENT
Start: 2019-09-23 | End: 2019-09-24

## 2019-09-23 RX ORDER — SCOLOPAMINE TRANSDERMAL SYSTEM 1 MG/1
1 PATCH, EXTENDED RELEASE TRANSDERMAL
Status: DISCONTINUED | OUTPATIENT
Start: 2019-09-23 | End: 2019-09-24 | Stop reason: HOSPADM

## 2019-09-23 RX ORDER — PROPOFOL 10 MG/ML
INJECTION, EMULSION INTRAVENOUS AS NEEDED
Status: DISCONTINUED | OUTPATIENT
Start: 2019-09-23 | End: 2019-09-23 | Stop reason: SURG

## 2019-09-23 RX ORDER — SIMETHICONE 80 MG
80 TABLET,CHEWABLE ORAL 4 TIMES DAILY PRN
Status: DISCONTINUED | OUTPATIENT
Start: 2019-09-23 | End: 2019-09-24 | Stop reason: HOSPADM

## 2019-09-23 RX ORDER — 0.9 % SODIUM CHLORIDE 0.9 %
VIAL (ML) INJECTION AS NEEDED
Status: DISCONTINUED | OUTPATIENT
Start: 2019-09-23 | End: 2019-09-23 | Stop reason: HOSPADM

## 2019-09-23 RX ORDER — MIDAZOLAM HYDROCHLORIDE 1 MG/ML
INJECTION INTRAMUSCULAR; INTRAVENOUS AS NEEDED
Status: DISCONTINUED | OUTPATIENT
Start: 2019-09-23 | End: 2019-09-23 | Stop reason: SURG

## 2019-09-23 RX ORDER — HYDROMORPHONE HYDROCHLORIDE 2 MG/ML
INJECTION, SOLUTION INTRAMUSCULAR; INTRAVENOUS; SUBCUTANEOUS AS NEEDED
Status: DISCONTINUED | OUTPATIENT
Start: 2019-09-23 | End: 2019-09-23 | Stop reason: SURG

## 2019-09-23 RX ORDER — ONDANSETRON 2 MG/ML
INJECTION INTRAMUSCULAR; INTRAVENOUS AS NEEDED
Status: DISCONTINUED | OUTPATIENT
Start: 2019-09-23 | End: 2019-09-23 | Stop reason: SURG

## 2019-09-23 RX ORDER — MORPHINE SULFATE 4 MG/ML
4 INJECTION, SOLUTION INTRAMUSCULAR; INTRAVENOUS EVERY 2 HOUR PRN
Status: DISCONTINUED | OUTPATIENT
Start: 2019-09-23 | End: 2019-09-24 | Stop reason: HOSPADM

## 2019-09-23 RX ORDER — ACETAMINOPHEN 325 MG/1
975 TABLET ORAL EVERY 8 HOURS SCHEDULED
Status: DISCONTINUED | OUTPATIENT
Start: 2019-09-23 | End: 2019-09-24 | Stop reason: HOSPADM

## 2019-09-23 RX ORDER — MAGNESIUM HYDROXIDE 1200 MG/15ML
LIQUID ORAL AS NEEDED
Status: DISCONTINUED | OUTPATIENT
Start: 2019-09-23 | End: 2019-09-23 | Stop reason: HOSPADM

## 2019-09-23 RX ORDER — ONDANSETRON 2 MG/ML
4 INJECTION INTRAMUSCULAR; INTRAVENOUS EVERY 6 HOURS PRN
Status: DISCONTINUED | OUTPATIENT
Start: 2019-09-23 | End: 2019-09-24 | Stop reason: HOSPADM

## 2019-09-23 RX ORDER — HEPARIN SODIUM 5000 [USP'U]/ML
5000 INJECTION, SOLUTION INTRAVENOUS; SUBCUTANEOUS
Status: COMPLETED | OUTPATIENT
Start: 2019-09-23 | End: 2019-09-23

## 2019-09-23 RX ORDER — PROMETHAZINE HYDROCHLORIDE 25 MG/ML
12.5 INJECTION, SOLUTION INTRAMUSCULAR; INTRAVENOUS ONCE
Status: COMPLETED | OUTPATIENT
Start: 2019-09-23 | End: 2019-09-23

## 2019-09-23 RX ORDER — CEFAZOLIN SODIUM 2 G/50ML
2000 SOLUTION INTRAVENOUS ONCE
Status: DISCONTINUED | OUTPATIENT
Start: 2019-09-23 | End: 2019-09-23

## 2019-09-23 RX ORDER — METOCLOPRAMIDE HYDROCHLORIDE 5 MG/ML
10 INJECTION INTRAMUSCULAR; INTRAVENOUS EVERY 6 HOURS PRN
Status: DISCONTINUED | OUTPATIENT
Start: 2019-09-23 | End: 2019-09-24 | Stop reason: HOSPADM

## 2019-09-23 RX ORDER — GLYCOPYRROLATE 0.2 MG/ML
INJECTION INTRAMUSCULAR; INTRAVENOUS AS NEEDED
Status: DISCONTINUED | OUTPATIENT
Start: 2019-09-23 | End: 2019-09-23 | Stop reason: SURG

## 2019-09-23 RX ORDER — DEXAMETHASONE SODIUM PHOSPHATE 10 MG/ML
INJECTION, SOLUTION INTRAMUSCULAR; INTRAVENOUS AS NEEDED
Status: DISCONTINUED | OUTPATIENT
Start: 2019-09-23 | End: 2019-09-23 | Stop reason: SURG

## 2019-09-23 RX ORDER — ONDANSETRON 2 MG/ML
4 INJECTION INTRAMUSCULAR; INTRAVENOUS ONCE AS NEEDED
Status: DISCONTINUED | OUTPATIENT
Start: 2019-09-23 | End: 2019-09-23 | Stop reason: HOSPADM

## 2019-09-23 RX ORDER — OXYCODONE HCL 5 MG/5 ML
10 SOLUTION, ORAL ORAL EVERY 6 HOURS PRN
Status: DISCONTINUED | OUTPATIENT
Start: 2019-09-23 | End: 2019-09-23

## 2019-09-23 RX ORDER — HYDRALAZINE HYDROCHLORIDE 20 MG/ML
10 INJECTION INTRAMUSCULAR; INTRAVENOUS ONCE
Status: COMPLETED | OUTPATIENT
Start: 2019-09-23 | End: 2019-09-23

## 2019-09-23 RX ORDER — FENTANYL CITRATE 50 UG/ML
INJECTION, SOLUTION INTRAMUSCULAR; INTRAVENOUS AS NEEDED
Status: DISCONTINUED | OUTPATIENT
Start: 2019-09-23 | End: 2019-09-23 | Stop reason: SURG

## 2019-09-23 RX ORDER — PROMETHAZINE HYDROCHLORIDE 25 MG/ML
25 INJECTION, SOLUTION INTRAMUSCULAR; INTRAVENOUS EVERY 6 HOURS PRN
Status: DISCONTINUED | OUTPATIENT
Start: 2019-09-23 | End: 2019-09-23

## 2019-09-23 RX ORDER — PROMETHAZINE HYDROCHLORIDE 25 MG/ML
12.5 INJECTION, SOLUTION INTRAMUSCULAR; INTRAVENOUS EVERY 6 HOURS PRN
Status: DISCONTINUED | OUTPATIENT
Start: 2019-09-23 | End: 2019-09-24 | Stop reason: HOSPADM

## 2019-09-23 RX ORDER — OXYCODONE HCL 5 MG/5 ML
10 SOLUTION, ORAL ORAL EVERY 6 HOURS PRN
Status: DISCONTINUED | OUTPATIENT
Start: 2019-09-23 | End: 2019-09-24 | Stop reason: HOSPADM

## 2019-09-23 RX ORDER — SODIUM CHLORIDE 9 MG/ML
125 INJECTION, SOLUTION INTRAVENOUS CONTINUOUS
Status: DISCONTINUED | OUTPATIENT
Start: 2019-09-23 | End: 2019-09-23

## 2019-09-23 RX ORDER — ROCURONIUM BROMIDE 10 MG/ML
INJECTION, SOLUTION INTRAVENOUS AS NEEDED
Status: DISCONTINUED | OUTPATIENT
Start: 2019-09-23 | End: 2019-09-23 | Stop reason: SURG

## 2019-09-23 RX ORDER — LIDOCAINE HYDROCHLORIDE 10 MG/ML
INJECTION, SOLUTION INFILTRATION; PERINEURAL AS NEEDED
Status: DISCONTINUED | OUTPATIENT
Start: 2019-09-23 | End: 2019-09-23 | Stop reason: SURG

## 2019-09-23 RX ORDER — NEOSTIGMINE METHYLSULFATE 1 MG/ML
INJECTION INTRAVENOUS AS NEEDED
Status: DISCONTINUED | OUTPATIENT
Start: 2019-09-23 | End: 2019-09-23 | Stop reason: SURG

## 2019-09-23 RX ORDER — OXYCODONE HCL 5 MG/5 ML
5 SOLUTION, ORAL ORAL EVERY 4 HOURS PRN
Status: DISCONTINUED | OUTPATIENT
Start: 2019-09-23 | End: 2019-09-24 | Stop reason: HOSPADM

## 2019-09-23 RX ORDER — FENTANYL CITRATE/PF 50 MCG/ML
50 SYRINGE (ML) INJECTION
Status: DISCONTINUED | OUTPATIENT
Start: 2019-09-23 | End: 2019-09-23 | Stop reason: HOSPADM

## 2019-09-23 RX ORDER — BUPIVACAINE HYDROCHLORIDE 5 MG/ML
INJECTION, SOLUTION PERINEURAL AS NEEDED
Status: DISCONTINUED | OUTPATIENT
Start: 2019-09-23 | End: 2019-09-23 | Stop reason: HOSPADM

## 2019-09-23 RX ADMIN — PROPOFOL 200 MG: 10 INJECTION, EMULSION INTRAVENOUS at 07:33

## 2019-09-23 RX ADMIN — MORPHINE SULFATE 2 MG: 2 INJECTION, SOLUTION INTRAMUSCULAR; INTRAVENOUS at 23:45

## 2019-09-23 RX ADMIN — HYDROMORPHONE HYDROCHLORIDE 0.6 MG: 2 INJECTION, SOLUTION INTRAMUSCULAR; INTRAVENOUS; SUBCUTANEOUS at 08:55

## 2019-09-23 RX ADMIN — FENTANYL CITRATE 150 MCG: 50 INJECTION, SOLUTION INTRAMUSCULAR; INTRAVENOUS at 07:33

## 2019-09-23 RX ADMIN — MORPHINE SULFATE 4 MG: 4 INJECTION INTRAVENOUS at 13:15

## 2019-09-23 RX ADMIN — ONDANSETRON 4 MG: 2 INJECTION INTRAMUSCULAR; INTRAVENOUS at 20:29

## 2019-09-23 RX ADMIN — OXYCODONE HYDROCHLORIDE 10 MG: 5 SOLUTION ORAL at 20:28

## 2019-09-23 RX ADMIN — SCOPALAMINE 1 PATCH: 1 PATCH, EXTENDED RELEASE TRANSDERMAL at 13:19

## 2019-09-23 RX ADMIN — FENTANYL CITRATE 50 MCG: 50 INJECTION, SOLUTION INTRAMUSCULAR; INTRAVENOUS at 09:30

## 2019-09-23 RX ADMIN — TRIMETHOBENZAMIDE HYDROCHLORIDE 200 MG: 100 INJECTION INTRAMUSCULAR at 10:31

## 2019-09-23 RX ADMIN — HEPARIN SODIUM 5000 UNITS: 5000 INJECTION INTRAVENOUS; SUBCUTANEOUS at 06:14

## 2019-09-23 RX ADMIN — FENTANYL CITRATE 50 MCG: 50 INJECTION INTRAMUSCULAR; INTRAVENOUS at 10:28

## 2019-09-23 RX ADMIN — METOCLOPRAMIDE 10 MG: 5 INJECTION, SOLUTION INTRAMUSCULAR; INTRAVENOUS at 15:40

## 2019-09-23 RX ADMIN — FENTANYL CITRATE 50 MCG: 50 INJECTION, SOLUTION INTRAMUSCULAR; INTRAVENOUS at 08:37

## 2019-09-23 RX ADMIN — FENTANYL CITRATE 50 MCG: 50 INJECTION INTRAMUSCULAR; INTRAVENOUS at 10:39

## 2019-09-23 RX ADMIN — FAMOTIDINE 20 MG: 10 INJECTION, SOLUTION INTRAVENOUS at 12:03

## 2019-09-23 RX ADMIN — FAMOTIDINE 20 MG: 10 INJECTION, SOLUTION INTRAVENOUS at 20:21

## 2019-09-23 RX ADMIN — SODIUM CHLORIDE, SODIUM LACTATE, POTASSIUM CHLORIDE, AND CALCIUM CHLORIDE 75 ML/HR: .6; .31; .03; .02 INJECTION, SOLUTION INTRAVENOUS at 20:53

## 2019-09-23 RX ADMIN — SODIUM CHLORIDE: 0.9 INJECTION, SOLUTION INTRAVENOUS at 09:31

## 2019-09-23 RX ADMIN — CEFAZOLIN SODIUM 2000 MG: 2 SOLUTION INTRAVENOUS at 15:41

## 2019-09-23 RX ADMIN — DEXAMETHASONE SODIUM PHOSPHATE 8 MG: 10 INJECTION, SOLUTION INTRAMUSCULAR; INTRAVENOUS at 07:56

## 2019-09-23 RX ADMIN — METRONIDAZOLE 500 MG: 500 INJECTION, SOLUTION INTRAVENOUS at 23:39

## 2019-09-23 RX ADMIN — HYDROMORPHONE HYDROCHLORIDE 0.4 MG: 2 INJECTION, SOLUTION INTRAMUSCULAR; INTRAVENOUS; SUBCUTANEOUS at 08:23

## 2019-09-23 RX ADMIN — SODIUM CHLORIDE: 0.9 INJECTION, SOLUTION INTRAVENOUS at 08:09

## 2019-09-23 RX ADMIN — MORPHINE SULFATE 4 MG: 4 INJECTION INTRAVENOUS at 15:40

## 2019-09-23 RX ADMIN — SODIUM CHLORIDE: 0.9 INJECTION, SOLUTION INTRAVENOUS at 08:55

## 2019-09-23 RX ADMIN — Medication 3000 MG: at 07:20

## 2019-09-23 RX ADMIN — SODIUM CHLORIDE 125 ML/HR: 0.9 INJECTION, SOLUTION INTRAVENOUS at 06:12

## 2019-09-23 RX ADMIN — NEOSTIGMINE METHYLSULFATE 3 MG: 1 INJECTION, SOLUTION INTRAVENOUS at 09:41

## 2019-09-23 RX ADMIN — GLYCOPYRROLATE 0.4 MG: 0.2 INJECTION INTRAMUSCULAR; INTRAVENOUS at 09:41

## 2019-09-23 RX ADMIN — ROCURONIUM BROMIDE 30 MG: 100 INJECTION, SOLUTION INTRAVENOUS at 08:37

## 2019-09-23 RX ADMIN — ROCURONIUM BROMIDE 50 MG: 100 INJECTION, SOLUTION INTRAVENOUS at 07:33

## 2019-09-23 RX ADMIN — LIDOCAINE HYDROCHLORIDE 60 MG: 10 INJECTION, SOLUTION INFILTRATION; PERINEURAL at 07:33

## 2019-09-23 RX ADMIN — CEFAZOLIN SODIUM 2000 MG: 2 SOLUTION INTRAVENOUS at 23:39

## 2019-09-23 RX ADMIN — SODIUM CHLORIDE, SODIUM LACTATE, POTASSIUM CHLORIDE, AND CALCIUM CHLORIDE 75 ML/HR: .6; .31; .03; .02 INJECTION, SOLUTION INTRAVENOUS at 11:32

## 2019-09-23 RX ADMIN — PROMETHAZINE HYDROCHLORIDE 12.5 MG: 25 INJECTION INTRAMUSCULAR; INTRAVENOUS at 11:07

## 2019-09-23 RX ADMIN — FENTANYL CITRATE 50 MCG: 50 INJECTION, SOLUTION INTRAMUSCULAR; INTRAVENOUS at 09:31

## 2019-09-23 RX ADMIN — HYDRALAZINE HYDROCHLORIDE 10 MG: 20 INJECTION INTRAMUSCULAR; INTRAVENOUS at 10:51

## 2019-09-23 RX ADMIN — ONDANSETRON 8 MG: 2 INJECTION INTRAMUSCULAR; INTRAVENOUS at 07:56

## 2019-09-23 RX ADMIN — METRONIDAZOLE 500 MG: 500 INJECTION, SOLUTION INTRAVENOUS at 07:20

## 2019-09-23 RX ADMIN — HYDROMORPHONE HYDROCHLORIDE 1 MG: 2 INJECTION, SOLUTION INTRAMUSCULAR; INTRAVENOUS; SUBCUTANEOUS at 08:09

## 2019-09-23 RX ADMIN — MIDAZOLAM 2 MG: 1 INJECTION INTRAMUSCULAR; INTRAVENOUS at 07:27

## 2019-09-23 RX ADMIN — ONDANSETRON 4 MG: 2 INJECTION INTRAMUSCULAR; INTRAVENOUS at 13:12

## 2019-09-23 RX ADMIN — METRONIDAZOLE 500 MG: 500 INJECTION, SOLUTION INTRAVENOUS at 16:44

## 2019-09-23 NOTE — PLAN OF CARE
Problem: CHANGE IN BODY IMAGE  Goal: Pt/Family communicate acceptance of loss or change in body image and expresses psychological comfort and peace  Description  INTERVENTIONS:  - Assess patient/family anxiety and grief process related to change in body image, loss of functional status and loss of sense of self  - Assess patient/family's coping skills and provide emotional, spiritual and psychosocial support  - Provide information about the patient's health status with consideration of family and cultural values  - Communicate willingness to discuss loss and facilitate grief process with patient/family as appropriate  - Emphasize sustaining relationships within family system and community, or mati/spiritual traditions  - Refer to community support groups as appropriate  - Initiate Spiritual Care, Pastoral care or other ancillary consults as needed  Outcome: Progressing     Problem: CARDIOVASCULAR - ADULT  Goal: Maintains optimal cardiac output and hemodynamic stability  Description  INTERVENTIONS:  - Monitor I/O, vital signs and rhythm  - Monitor for S/S and trends of decreased cardiac output  - Administer and titrate ordered vasoactive medications to optimize hemodynamic stability  - Assess quality of pulses, skin color and temperature  - Assess for signs of decreased coronary artery perfusion  - Instruct patient to report change in severity of symptoms  Outcome: Progressing  Goal: Absence of cardiac dysrhythmias or at baseline rhythm  Description  INTERVENTIONS:  - Continuous cardiac monitoring, vital signs, obtain 12 lead EKG if ordered  - Administer antiarrhythmic and heart rate control medications as ordered  - Monitor electrolytes and administer replacement therapy as ordered  Outcome: Progressing     Problem: RESPIRATORY - ADULT  Goal: Achieves optimal ventilation and oxygenation  Description  INTERVENTIONS:  - Assess for changes in respiratory status  - Assess for changes in mentation and behavior  - Position to facilitate oxygenation and minimize respiratory effort  - Oxygen administered by appropriate delivery if ordered  - Initiate smoking cessation education as indicated  - Encourage broncho-pulmonary hygiene including cough, deep breathe, Incentive Spirometry  - Assess the need for suctioning and aspirate as needed  - Assess and instruct to report SOB or any respiratory difficulty  - Respiratory Therapy support as indicated  Outcome: Progressing     Problem: GASTROINTESTINAL - ADULT  Goal: Minimal or absence of nausea and/or vomiting  Description  INTERVENTIONS:  - Administer IV fluids if ordered to ensure adequate hydration  - Maintain NPO status until nausea and vomiting are resolved  - Nasogastric tube if ordered  - Administer ordered antiemetic medications as needed  - Provide nonpharmacologic comfort measures as appropriate  - Advance diet as tolerated, if ordered  - Consider nutrition services referral to assist patient with adequate nutrition and appropriate food choices  Outcome: Progressing  Goal: Maintains or returns to baseline bowel function  Description  INTERVENTIONS:  - Assess bowel function  - Encourage oral fluids to ensure adequate hydration  - Administer IV fluids if ordered to ensure adequate hydration  - Administer ordered medications as needed  - Encourage mobilization and activity  - Consider nutritional services referral to assist patient with adequate nutrition and appropriate food choices  Outcome: Progressing  Goal: Maintains adequate nutritional intake  Description  INTERVENTIONS:  - Monitor percentage of each meal consumed  - Identify factors contributing to decreased intake, treat as appropriate  - Assist with meals as needed  - Monitor I&O, weight, and lab values if indicated  - Obtain nutrition services referral as needed  Outcome: Progressing     Problem: GENITOURINARY - ADULT  Goal: Maintains or returns to baseline urinary function  Description  INTERVENTIONS:  - Assess urinary function  - Encourage oral fluids to ensure adequate hydration if ordered  - Administer IV fluids as ordered to ensure adequate hydration  - Administer ordered medications as needed  - Offer frequent toileting  - Follow urinary retention protocol if ordered  Outcome: Progressing  Goal: Absence of urinary retention  Description  INTERVENTIONS:  - Assess patients ability to void and empty bladder  - Monitor I/O  - Bladder scan as needed  - Discuss with physician/AP medications to alleviate retention as needed  - Discuss catheterization for long term situations as appropriate  Outcome: Progressing     Problem: METABOLIC, FLUID AND ELECTROLYTES - ADULT  Goal: Electrolytes maintained within normal limits  Description  INTERVENTIONS:  - Monitor labs and assess patient for signs and symptoms of electrolyte imbalances  - Administer electrolyte replacement as ordered  - Monitor response to electrolyte replacements, including repeat lab results as appropriate  - Instruct patient on fluid and nutrition as appropriate  Outcome: Progressing  Goal: Fluid balance maintained  Description  INTERVENTIONS:  - Monitor labs   - Monitor I/O and WT  - Instruct patient on fluid and nutrition as appropriate  - Assess for signs & symptoms of volume excess or deficit  Outcome: Progressing     Problem: SKIN/TISSUE INTEGRITY - ADULT  Goal: Skin integrity remains intact  Description  INTERVENTIONS  - Identify patients at risk for skin breakdown  - Assess and monitor skin integrity  - Assess and monitor nutrition and hydration status  - Monitor labs (i e  albumin)  - Assess for incontinence   - Turn and reposition patient  - Assist with mobility/ambulation  - Relieve pressure over bony prominences  - Avoid friction and shearing  - Provide appropriate hygiene as needed including keeping skin clean and dry  - Evaluate need for skin moisturizer/barrier cream  - Collaborate with interdisciplinary team (i e  Nutrition, Rehabilitation, etc ) - Patient/family teaching  Outcome: Progressing  Goal: Incision(s), wounds(s) or drain site(s) healing without S/S of infection  Description  INTERVENTIONS  - Assess and document risk factors for skin impairment   - Assess and document dressing, incision, wound bed, drain sites and surrounding tissue  - Consider nutrition services referral as needed  - Oral mucous membranes remain intact  - Provide patient/ family education  Outcome: Progressing  Goal: Oral mucous membranes remain intact  Description  INTERVENTIONS  - Assess oral mucosa and hygiene practices  - Implement preventative oral hygiene regimen  - Implement oral medicated treatments as ordered  - Initiate Nutrition services referral as needed  Outcome: Progressing     Problem: HEMATOLOGIC - ADULT  Goal: Maintains hematologic stability  Description  INTERVENTIONS  - Assess for signs and symptoms of bleeding or hemorrhage  - Monitor labs  - Administer supportive blood products/factors as ordered and appropriate  Outcome: Progressing

## 2019-09-23 NOTE — OP NOTE
OPERATIVE REPORT  PATIENT NAME: Ramya Barroso    :  1980  MRN: [id number]  Pt Location: [facility] OR ROOM 07    SURGERY DATE: 2019    Surgeon(s) and Role:     * Jael Fragoso MD - Primary     * Manual Gaucher, MD - Assisting     * Kalani Jain PA-C - Assisting    Preop Diagnosis:  Morbid obesity ([place] [address] ) [E66 01]Body mass index is 51 56 kg/m²  Post-Op Diagnosis Codes:     * Morbid obesity ([place] [address] ) [E66 01]    Procedure(s) (LRB):  LAPAROSCOPIC YUNIOR-EN-Y GASTRIC BYPASS AND INTRAOPERATIVE EGD (N/A)    Specimen(s):  * No specimens in log *    Estimated Blood Loss:   20 mL    Drains:  * No LDAs found *    Anesthesia Type:   General    Operative Indications: Morbid obesity ([place] [address] ) [E66 01]      Operative Findings:  Normal Findings    Complications:   None    Procedure and Technique:  Laparoscopic Yunior-en-Y gastric bypass with intraop endoscopy   I was present for the entire procedure    Patient Disposition:  hemodynamically stable     No qualified resident available to assist  The presence of an assistant was necessary for camera holding, traction and counter traction and for help with suturing and stapling in addition to performing the intraop-EGD    Indication:   Patient suffers from morbid obesity and associated co-morbidities  and failed to achieve any meaningful or sustainable weight loss and was therefore consented to undergo a laparoscopic gastric bypass  Risks and benefits were explained to the patient, patient consented for the procedure  Description of the procedure: The patient was placed in a supine position  The patient received a dose of IV antibiotics and a dose of subcutaneous heparin prior to the procedure  The patient was induced and intubated under general endotracheal anesthesia  The abdominal wall was prepped and draped under sterile conditions in the usual fashion        After calling a time-out, we started the procedure by making an incision to the left of the midline 6 inches from the xiphoid process, and the abdominal cavity was accessed using an Optiview trocar  The abdominal cavity was first insufflated with CO2 to a pressure of 15 mmHg using a Veress needle and then the abdominal cavity was  accessed using a 12 mm trocar using the Optiview technique The abdominal cavity was inspected and there was no evidence of tumors or lesions  There was no evidence of injury to the bowel or bleeding from the insertion of the Optiview trocar  At that point, two 12-mm trocars were placed under direct visualization on the right side of the abdominal wall and two other 12-mm trocars were placed on the left side of the abdominal wall, all under direct visualization  A TAP block was performed using 20 ml of Exparel mixed with saline and 0 5 % Marcaine for a total of 100 ml  The procedure was started by lifting the omentum in a cephalad fashion  The ligament of Treitz was identified and then the small bowel was transected with an endo AUBREY using a white load  The mesentery of the small bowel was then transected using a harmonic scalpel  After that, the distal small bowel was run for 150 cm in a way to obtain a 150 cm long Mani limb  At that point, two enterotomies were made in the BP limb and the Mani limb with a Harmonic scalpel, and the jejunojejunostomy was fashioned using an endo AUBREY stapler with a white load  After firing the stapler, the staple line was inspected and there was no evidence of bleeding and the staple line was well formed  The common enterotomy of the jejunojejunostomy was closed in two layers using 2-0 Vicryl running suture for the first layer and  2-0 V LOC in a running fashion for the second layer  The mesenteric defect of the small bowel was approximated using nonabsorbable suture in a figure of eight fashion  At that point, a Brolin suture was placed to approximate the BP limb and the Mani limb in a way to prevent kinking of the Mani limb using 2-0 Vicryl suture   At that point, we turned our attention to the upper portion of the abdomen  Stephen Martin liver retractor was placed through a stab incision in the subxiphoid space, and the left lobe of the liver was retracted medially  An OG tube was placed in the stomach to decompress the stomach and was then removed  The angle of His was dissected with a combination of sharp dissection and blunt dissection  At that point, the pars flaccida was opened and a gastric pouch was created with serial firings of the endo AUBREY stapler with a purple cartridge reinforced with Seamguard  After the formation of the gastric pouch, the staple lines of the pouch and the gastric remnant were inspected and appeared to be well formed and also appeared to be hemostatic  At that point, a 22 OrVil was brought through the mouth and, after making a gastrotomy with electrocautery, the NG tube connected to the OrVil was brought through the pouch gastrotomy and removed from the abdominal cavity through the 12-mm trocar on the left side of the abdominal wall  At that point, the anvil appeared to be well positioned in the pouch  The omentum was then split in half with the harmonic scalpel to avoid tension on the mani limb  The end of the Mani limb was opened with a Harmonic scalpel  The 12-mm trocar site on the upper  right side of the abdominal wall was dilated, and the 25 circular stapler was brought through that incision into the abdominal cavity and placed inside the Mani limb  At that point, the stapler was engaged and connected to the anvil  There was no twisting of the pouch or the Mani limb  The stapler was fired and then removed from the abdominal cavity  The site of the mesentery of the overhang of the Mani limb was transected with the Harmonic scalpel, and then the overhang was transected using an endo AUBREY stapler with a white load   The gastrojejunostomy anastomosis was then reinforced with 2-0 Vicryl sutures placed at 3 oclock and 9 oclock to prevent any tension on the anastomosis  Marlinda Richard space was closed with a figure-of-eight suture using 0 Ethibond suture  At that point, a bowel clamp was placed around the Mani limb and an upper endoscopy was performed to check for hemostasis and airtightness  When the endoscopy was performed, the staple line of the pouch and the gastrojejunostomy anastomosis appeared to be hemostatic  At that point, the patient was placed in a supine position  The abdominal cavity was filled with saline and then the pouch and the Mani limb were distended with air and, at that point, we did not visualize any air bubbles suggestive of air leak  The bowel clamp was then removed  The gastrojejunostomy was covered with an omental flap that was secured in place with an absorbable suture in a simple fashion  The small bowel segment was removed from the abdominal cavity through the right upper trocar site using an endo-catch bag  The [facility] liver retractor was removed  sponge count was completed and was correct  The right upper quadrant incision was irrigated with saline  The right upper quadrant trocar incision and the midline incision were both closed with 1-0 Vicryl in a simple fashion  All the skin edges of the trocar sites were approximated with 4-0 Monocryl in a subcuticular inverted fashion  The patient was extubated and transferred to the PACU in stable condition           SIGNATURE: Linnette Montaño MD  DATE: September 23, 2019  TIME: 9:41 AM

## 2019-09-23 NOTE — ANESTHESIA PREPROCEDURE EVALUATION
Review of Systems/Medical History  Patient summary reviewed        Cardiovascular  Negative cardio ROS    Pulmonary  Negative pulmonary ROS        GI/Hepatic  Negative GI/hepatic ROS          Negative  ROS        Endo/Other  Negative endo/other ROS   Obesity  super morbid obesity   GYN  Negative gynecology ROS          Hematology  Negative hematology ROS      Musculoskeletal  Negative musculoskeletal ROS        Neurology  Negative neurology ROS      Psychology   Negative psychology ROS Anxiety, Depression ,              Physical Exam    Airway    Mallampati score: II  TM Distance: >3 FB  Neck ROM: full     Dental   No notable dental hx     Cardiovascular  Comment: Negative ROS, Rhythm: regular, Rate: normal, Cardiovascular exam normal    Pulmonary  Pulmonary exam normal Breath sounds clear to auscultation,     Other Findings        Anesthesia Plan  ASA Score- 3     Anesthesia Type- general with ASA Monitors  Additional Monitors:   Airway Plan: ETT  Plan Factors-    Induction- intravenous  Postoperative Plan-     Informed Consent- Anesthetic plan and risks discussed with patient

## 2019-09-23 NOTE — INTERVAL H&P NOTE
H&P reviewed  After examining the patient I find no changes in the patients condition since the H&P had been written      Vitals:    09/23/19 0558   BP: 160/90   Pulse: 92   Resp: 18   Temp: 98 1 °F (36 7 °C)   SpO2: 97%

## 2019-09-24 ENCOUNTER — TRANSITIONAL CARE MANAGEMENT (OUTPATIENT)
Dept: FAMILY MEDICINE CLINIC | Facility: CLINIC | Age: 39
End: 2019-09-24

## 2019-09-24 VITALS
HEIGHT: 65 IN | HEART RATE: 77 BPM | SYSTOLIC BLOOD PRESSURE: 119 MMHG | RESPIRATION RATE: 18 BRPM | TEMPERATURE: 98 F | WEIGHT: 293 LBS | DIASTOLIC BLOOD PRESSURE: 62 MMHG | BODY MASS INDEX: 48.82 KG/M2 | OXYGEN SATURATION: 94 %

## 2019-09-24 LAB
ANION GAP SERPL CALCULATED.3IONS-SCNC: 9 MMOL/L (ref 4–13)
BUN SERPL-MCNC: 2 MG/DL (ref 5–25)
CALCIUM SERPL-MCNC: 8.5 MG/DL (ref 8.3–10.1)
CHLORIDE SERPL-SCNC: 107 MMOL/L (ref 100–108)
CO2 SERPL-SCNC: 26 MMOL/L (ref 21–32)
CREAT SERPL-MCNC: 0.6 MG/DL (ref 0.6–1.3)
ERYTHROCYTE [DISTWIDTH] IN BLOOD BY AUTOMATED COUNT: 13.2 % (ref 11.6–15.1)
GFR SERPL CREATININE-BSD FRML MDRD: 116 ML/MIN/1.73SQ M
GLUCOSE SERPL-MCNC: 123 MG/DL (ref 65–140)
HCT VFR BLD AUTO: 36.4 % (ref 34.8–46.1)
HGB BLD-MCNC: 11.9 G/DL (ref 11.5–15.4)
MCH RBC QN AUTO: 30.8 PG (ref 26.8–34.3)
MCHC RBC AUTO-ENTMCNC: 32.7 G/DL (ref 31.4–37.4)
MCV RBC AUTO: 94 FL (ref 82–98)
PLATELET # BLD AUTO: 249 THOUSANDS/UL (ref 149–390)
PMV BLD AUTO: 10.1 FL (ref 8.9–12.7)
POTASSIUM SERPL-SCNC: 3.4 MMOL/L (ref 3.5–5.3)
RBC # BLD AUTO: 3.86 MILLION/UL (ref 3.81–5.12)
SODIUM SERPL-SCNC: 142 MMOL/L (ref 136–145)
WBC # BLD AUTO: 13.63 THOUSAND/UL (ref 4.31–10.16)

## 2019-09-24 PROCEDURE — NC001 PR NO CHARGE: Performed by: SURGERY

## 2019-09-24 PROCEDURE — 80048 BASIC METABOLIC PNL TOTAL CA: CPT | Performed by: PHYSICIAN ASSISTANT

## 2019-09-24 PROCEDURE — 99024 POSTOP FOLLOW-UP VISIT: CPT | Performed by: SURGERY

## 2019-09-24 PROCEDURE — 85027 COMPLETE CBC AUTOMATED: CPT | Performed by: PHYSICIAN ASSISTANT

## 2019-09-24 RX ORDER — ACETAMINOPHEN 325 MG/1
975 TABLET ORAL EVERY 8 HOURS SCHEDULED
Qty: 30 TABLET | Refills: 0 | Status: SHIPPED | OUTPATIENT
Start: 2019-09-24

## 2019-09-24 RX ADMIN — FAMOTIDINE 20 MG: 10 INJECTION, SOLUTION INTRAVENOUS at 09:52

## 2019-09-24 RX ADMIN — OXYCODONE HYDROCHLORIDE 5 MG: 5 SOLUTION ORAL at 09:56

## 2019-09-24 RX ADMIN — ACETAMINOPHEN 975 MG: 325 TABLET ORAL at 05:33

## 2019-09-24 RX ADMIN — OXYCODONE HYDROCHLORIDE 5 MG: 5 SOLUTION ORAL at 05:33

## 2019-09-24 NOTE — UTILIZATION REVIEW
Initial Clinical Review    Elective    IP    surgical procedure    Age/Sex: 46 y o  female     Surgery Date:    9/23/2019    Procedure: LAPAROSCOPIC YUNIOR-EN-Y GASTRIC BYPASS AND INTRAOPERATIVE EGD (N/A)       Anesthesia:    general    Operative Findings:   normal    Admission Orders: Date/Time/Statement:   Inpatient Admission Orders (From admission, onward)     Ordered        09/23/19 1011  Inpatient Admission  Once                   Orders Placed This Encounter   Procedures    Inpatient Admission     Standing Status:   Standing     Number of Occurrences:   1     Order Specific Question:   Admitting Physician     Answer:   CORAZON Rogel [[id number]]     Order Specific Question:   Level of Care     Answer:   Med Surg [16]     Order Specific Question:   Bed Type     Answer:   Bariatric [1]     Order Specific Question:   Estimated length of stay     Answer:   Inpatient Only Surgery     Vital Signs: /62 (BP Location: Right arm)   Pulse 77   Temp 98 °F (36 7 °C) (Temporal)   Resp 18   Ht 5' 4 5" (1 638 m)   Wt (!) 138 kg (305 lb 1 9 oz)   LMP 08/28/2019 Comment: urine hcg negative   SpO2 94%   BMI 51 56 kg/m²      Diet:    Bariatric  Cl liq  diet    Mobility:    As  deja    DVT Prophylaxis:    SCD'S    Medications/Pain Control:     IV  Ancef   Q 8 hrs  IV  pepcid  Q 12 hrs  IV  Flagyl  Q8 hrs  IVF  75/hr  IV  reglan  PRN ( x1  9/23)  IV  MSO4  PRN   (  x2  9/23)     IV  zofran PRN  (  x1  9/23)    Network Utilization Review Department  Phone: [phone]; Fax [phone]  [email or web address]  ATTENTION: Please call with any questions or concerns to [phone]  and carefully listen to the prompts so that you are directed to the right person  Send all requests for admission clinical reviews, approved or denied determinations and any other requests to fax [phone]   All voicemails are confidential

## 2019-09-24 NOTE — DISCHARGE INSTRUCTIONS
Bariatric/Weight Loss Surgery  Hospital Discharge Instructions  1  ACTIVITY:  a  Progress as feels comfortable - a good rule is:  if you are doing something and it begins to hurt, stop doing the activity  Walk every hour while at home  b  Shelbi Norris may walk stairs if you do so slowly  c  You may shower 48 hours after surgery  d  Use your incentive spirometer 10 times per hour while awake for 1 week  e  Do NOT drive for 48 hours after surgery  No driving 24 hours after taking certain prescription pain medications   Examples of such medication are Percocet, Darvocet, Oxycodone, Tylenol #3, and Tylenol with Codeine  Follow your pharmacists orders  2  DIET  a  Stay on a liquid diet for 7 days after your surgery date, sipping slowly  Refer to your manual for examples of choices  Remember to keep your liquids sugar free or low calorie  You may have protein drinks  Make sure to drink 48 to 64 ounces per day of fluids  b  Shelbijulieta Norris may advance to a pureed diet one week after surgery as instructed by your diet progression pamphlet  Once you get approval from your surgeon at your first post operative visit you may advance to the soft diet  3  MEDICATIONS:  a  The abdominal nerve block will wear off during the first 1-2 days that you are home, and you may become sore  Continue to take your Tylenol and your pain medication as instructed  b  Start vitamins and minerals when you get home  c  Anti-acid Medication as per prescription  d  Other medications as indicated on the Physician Patient Discharge Instructions form given to you at the time of discharge  e  Make sure that you are splitting your pill or tablet medications in halves or fourths or even crushing them before you take them  Capsules should be opened and mixed with water or jello  You need to do this for at least 4 weeks after surgery  Eventually you will be able to take your medications the regular way as they were prescribed     f  Shelbi Norris will need to consult with your Family Doctor in regards to all your prescribed medication, particularly those for blood pressure and diabetes  As you lose weight, medical conditions may change, requiring an alteration or elimination of the drug dose  g  DO NOT TAKE BIRTH CONTROL(BC) MEDICATIONS, INSERT BC VAGINAL RINGS, OR PLACE IUD OR ANY OTHER BC METHODS UNTIL 31 DAYS FROM DAY OF DISCHARGE FROM HOSPITAL  THIS PLACES YOU AT HIGH RISK FOR A POTENTIALLY LIFE THREATENING BLOOD CLOT  Remember to always use barrier methods for birth control and speak to your GYN about using two forms of birth control to start 31 days after surgery  It is very important to avoid pregnancy until at least 18-24 months after surgery  4  INCISION CARE  a  You may shower and get incisions wet 2 days after surgery  No soaking tub baths or swimming for 30 days after surgery  Keep abdominal area and incisions clean  Use soap and water to create a good lather and rinse off  Do not scrub incisions  b  If you have a drain, empty the drain as the nurses instructed  5  FOLLOW-UP APPOINTMENT should be made for one week after discharge  Call surgeons office at [phone] to schedule an appointment      6  CALL YOUR DOCTOR FOR:  pain not controlled by pain medications, a temperature greater than 101 5° F, any increase or change in drainage or redness from any incision, any vomiting or inability to keep liquids down, shortness of breath, shoulder pain, or bleeding

## 2019-09-24 NOTE — DISCHARGE INSTR - AVS FIRST PAGE
your pain medications from [facility] in [address]   Take Tylenol as instructed  DO NOT exceed 4000 mg of tylenol per day   Stay hydrated and follow your discharge diet progression   Mild nausea is ok as long as you can drink fluids  Take your omeprazole daily  Crush or cut your pills and open capsules, mix with liquid to drink    Follow up with Dr Gee Villagran and your PCP within the next week

## 2019-09-24 NOTE — PROGRESS NOTES
Progress Note - Bariatric Surgery   Dora Miller 46 y o  female MRN: [id number]  Unit/Bed#: E5 -01 Encounter: [id number]      Subjective/Objective     SUBJECTIVE     Patient with morbid obesity, POD 1 s/p   Seen and examined at the bedside this AM    Pain adequately controlled w/oral/IV pain medication  Tolerating liquid diet with no nausea or vomiting  Voiding adequately postop  Denies flatus or BMs  Ambulating without assistance and using the incentive spirometer  OBJECTIVE    /62 (BP Location: Right arm)   Pulse 77   Temp 98 °F (36 7 °C) (Temporal)   Resp 18   Ht 5' 4 5" (1 638 m)   Wt (!) 138 kg (305 lb 1 9 oz)   LMP 08/28/2019 Comment: urine hcg negative   SpO2 94%   BMI 51 56 kg/m²       Intake/Output Summary (Last 24 hours) at 9/24/2019 0826  Last data filed at 9/24/2019 0501  Gross per 24 hour   Intake 3810 ml   Output 1870 ml   Net 1940 ml       Invasive Devices     Peripheral Intravenous Line            Peripheral IV 09/23/19 Left Hand 1 day                ROS: 10-point system completed  All negative except see HPI  Physical Exam    General Appearance:    Alert, cooperative, no distress, appears stated age   Head:    Normocephalic, without obvious abnormality, atraumatic   Lungs:     Respirations unlabored   Heart:    Regular rate and rhythm   Abdomen:     Soft, appropriate tenderness to palpation,  no masses, no organomegaly, mildly distended  Incisions are clean, dry and intact  Abdomen is benign with no peritoneal findings  Extremities:   Extremities normal, atraumatic, no cyanosis or edema   Neurologic:  Incision:  Psych:   Normal strength and sensation    Clean, dry, and intact    Normal mood and affect       Lab, Imaging and other studies:  I have personally reviewed pertinent lab results    , CBC:   Lab Results   Component Value Date    WBC 13 63 (H) 09/24/2019    HGB 11 9 09/24/2019    HCT 36 4 09/24/2019    MCV 94 09/24/2019     09/24/2019    MCH 30 8 09/24/2019    MCHC 32 7 09/24/2019    RDW 13 2 09/24/2019    MPV 10 1 09/24/2019   , CMP:   Lab Results   Component Value Date    SODIUM 142 09/24/2019    K 3 4 (L) 09/24/2019     09/24/2019    CO2 26 09/24/2019    BUN 2 (L) 09/24/2019    CREATININE 0 60 09/24/2019    CALCIUM 8 5 09/24/2019    EGFR 116 09/24/2019        Latest radiology: No results found  VTE Mechanical Prophylaxis: sequential compression device    ASSESSMENT  46 y o  female, POD 1 from laparoscopic Mani-en-Y gastric bypass for morbid obesity; currently undergoing a stable post-op course  PLAN  1) Diet/IVF  Encourage PO fluid intake  2) GI/DVT prophylaxis w/SCDs  Encourage ambulation  3) Pain and nausea management  4) finish IV abx surgical prophylaxis     Dispo: Anticipate discharge home this afternoon       Plan of care was discussed with patient and patient's nurse  Care plan discussed with Dr Sena Myers MD  Bariatric Surgery Fellow  9/24/2019  8:26 AM

## 2019-09-25 ENCOUNTER — TELEPHONE (OUTPATIENT)
Dept: BARIATRICS | Facility: CLINIC | Age: 39
End: 2019-09-25

## 2019-09-30 ENCOUNTER — OFFICE VISIT (OUTPATIENT)
Dept: FAMILY MEDICINE CLINIC | Facility: CLINIC | Age: 39
End: 2019-09-30
Payer: COMMERCIAL

## 2019-09-30 VITALS
WEIGHT: 293 LBS | HEIGHT: 65 IN | DIASTOLIC BLOOD PRESSURE: 74 MMHG | OXYGEN SATURATION: 98 % | HEART RATE: 88 BPM | SYSTOLIC BLOOD PRESSURE: 124 MMHG | BODY MASS INDEX: 48.82 KG/M2

## 2019-09-30 DIAGNOSIS — E66.01 MORBID OBESITY (HCC): Primary | ICD-10-CM

## 2019-09-30 DIAGNOSIS — E66.01 CLASS 3 SEVERE OBESITY WITHOUT SERIOUS COMORBIDITY WITH BODY MASS INDEX (BMI) OF 50.0 TO 59.9 IN ADULT, UNSPECIFIED OBESITY TYPE (HCC): ICD-10-CM

## 2019-09-30 PROCEDURE — 99495 TRANSJ CARE MGMT MOD F2F 14D: CPT | Performed by: FAMILY MEDICINE

## 2019-09-30 NOTE — PROGRESS NOTES
[facility]        NAME: William Rain is a 46 y o  female  : 1980    MRN: [id number]  DATE: 2019  TIME: 9:26 AM    Assessment and Plan   Morbid obesity ([facility] [address] ) [E66 01]  1  Morbid obesity (HCC)     2  Class 3 severe obesity without serious comorbidity with body mass index (BMI) of 50 0 to 59 9 in adult, unspecified obesity type [facility])           Patient Instructions     Patient Instructions   F/u surgeon---cult skin lesion if recurs          Chief Complaint     Chief Complaint   Patient presents with    Transition of Care Management         History of Present Illness       F/u TCM--hosp records rev--doing well      Review of Systems   Review of Systems   Constitutional: Negative for fatigue, fever and unexpected weight change  HENT: Negative for congestion, sinus pain and sore throat  Eyes: Negative for visual disturbance  Respiratory: Negative for shortness of breath and wheezing  Cardiovascular: Negative for chest pain and palpitations  Gastrointestinal: Negative for abdominal pain, nausea and vomiting  Musculoskeletal: Negative  Negative for arthralgias and myalgias  Neurological: Negative for syncope, weakness and numbness  Psychiatric/Behavioral: Negative  Negative for confusion, dysphoric mood and suicidal ideas           Current Medications       Current Outpatient Medications:     acetaminophen (TYLENOL) 325 mg tablet, Take 3 tablets (975 mg total) by mouth every 8 (eight) hours, Disp: 30 tablet, Rfl: 0    Multiple Vitamins-Minerals (WOMENS MULTI PO), , Disp: , Rfl:     omeprazole (PriLOSEC) 20 mg delayed release capsule, Take 1 capsule (20 mg total) by mouth daily, Disp: 90 capsule, Rfl: 1    vortioxetine (TRINTELLIX) 10 MG tablet, Take 1 tablet (10 mg total) by mouth daily, Disp: 90 tablet, Rfl: 1    Current Allergies     Allergies as of 2019 - Reviewed 2019   Allergen Reaction Noted    Sulfa antibiotics Hives 2019 The following portions of the patient's history were reviewed and updated as appropriate: allergies, current medications, past family history, past medical history, past social history, past surgical history and problem list      Past Medical History:   Diagnosis Date    Anxiety     Depression     Morbid obesity ([address] )        Past Surgical History:   Procedure Laterality Date    OVARIAN CYST REMOVAL      [place] LAP GASTRIC BYPASS/YUNIOR-EN-Y N/A 9/23/2019    Procedure: LAPAROSCOPIC YUNIOR-EN-Y GASTRIC BYPASS AND INTRAOPERATIVE EGD;  Surgeon: Yarelis Mancia MD;  Location: [facility];  Service: Bariatrics    WISDOM TOOTH EXTRACTION         Family History   Problem Relation Age of Onset    Hypertension Mother     Obesity Mother     Obesity Father     Depression Father    Sorensen Grills Sister     No Known Problems Brother     Drug abuse Cousin     Stroke Maternal Aunt          Medications have been verified  Objective   /74   Pulse 88   Ht 5' 4 5" (1 638 m)   Wt 134 kg (296 lb)   SpO2 98%   BMI 50 02 kg/m²        Physical Exam     Physical Exam   Constitutional: She is oriented to person, place, and time  Vital signs are normal  She appears well-developed and well-nourished  HENT:   Right Ear: Ear canal normal  Tympanic membrane is not injected  Left Ear: Ear canal normal  Tympanic membrane is not injected  Nose: Nose normal    Mouth/Throat: Oropharynx is clear and moist    Eyes: Pupils are equal, round, and reactive to light  Conjunctivae and EOM are normal  Right eye exhibits no discharge  Left eye exhibits no discharge  Neck: Normal range of motion  Neck supple  No thyromegaly present  Cardiovascular: Normal rate, regular rhythm and normal heart sounds  No murmur heard  Pulmonary/Chest: Effort normal and breath sounds normal  No respiratory distress  She has no wheezes  Abdominal: Soft  Bowel sounds are normal  She exhibits no distension  There is no tenderness  Musculoskeletal: Normal range of motion  Lymphadenopathy:     She has no cervical adenopathy  Neurological: She is alert and oriented to person, place, and time  She has normal strength and normal reflexes  She is not disoriented  No sensory deficit  Gait normal    Skin: Skin is warm and dry  Psychiatric: She has a normal mood and affect  Her speech is normal and behavior is normal  Judgment and thought content normal  Cognition and memory are normal      BMI Counseling: Body mass index is 50 02 kg/m²  The BMI is above normal  Nutrition recommendations include reducing portion sizes

## 2019-10-03 ENCOUNTER — OFFICE VISIT (OUTPATIENT)
Dept: BARIATRICS | Facility: CLINIC | Age: 39
End: 2019-10-03

## 2019-10-03 VITALS
WEIGHT: 293 LBS | TEMPERATURE: 98.2 F | DIASTOLIC BLOOD PRESSURE: 66 MMHG | SYSTOLIC BLOOD PRESSURE: 118 MMHG | RESPIRATION RATE: 18 BRPM | HEIGHT: 65 IN | HEART RATE: 91 BPM | BODY MASS INDEX: 48.82 KG/M2

## 2019-10-03 DIAGNOSIS — Z98.84 BARIATRIC SURGERY STATUS: ICD-10-CM

## 2019-10-03 DIAGNOSIS — E66.01 CLASS 3 SEVERE OBESITY WITHOUT SERIOUS COMORBIDITY WITH BODY MASS INDEX (BMI) OF 50.0 TO 59.9 IN ADULT, UNSPECIFIED OBESITY TYPE (HCC): Primary | ICD-10-CM

## 2019-10-03 DIAGNOSIS — E66.01 MORBID (SEVERE) OBESITY DUE TO EXCESS CALORIES (HCC): Primary | ICD-10-CM

## 2019-10-03 PROCEDURE — 99024 POSTOP FOLLOW-UP VISIT: CPT | Performed by: SURGERY

## 2019-10-03 PROCEDURE — RECHECK: Performed by: DIETITIAN, REGISTERED

## 2019-10-03 NOTE — PROGRESS NOTES
FIRST POST-OPERATIVE VISIT - BARIATRIC SURGERY  Sandra Arroyo 46 y o  female MRN: [id number]  Unit/Bed#:  Encounter: [id number]      HPI:  Sandra Arroyo is a 46 y o  female who presents for the 1st postoperative visit following a laparoscopic Mani-en-Y gastric bypass      Review of Systems    Historical Information   Past Medical History:   Diagnosis Date    Anxiety     Depression     Morbid obesity ([address] )      Past Surgical History:   Procedure Laterality Date    OVARIAN CYST REMOVAL      [place] LAP GASTRIC BYPASS/MANI-EN-Y N/A 2019    Procedure: LAPAROSCOPIC MANI-EN-Y GASTRIC BYPASS AND INTRAOPERATIVE EGD;  Surgeon: Umesh Graham MD;  Location: [facility] OR;  Service: Bariatrics    WISDOM TOOTH EXTRACTION       Social History   Social History     Substance and Sexual Activity   Alcohol Use Not Currently    Frequency: Monthly or less    Comment: rarely drink     Social History     Substance and Sexual Activity   Drug Use Not Currently    Types: Marijuana    Comment: very occasionally, last used 19      Social History     Tobacco Use   Smoking Status Former Smoker    Packs/day: 0 50    Years: 10 00    Pack years: 5 00    Types: Cigarettes    Last attempt to quit: 2013    Years since quittin 2   Smokeless Tobacco Never Used     Family History: non-contributory    Meds/Allergies   all medications and allergies reviewed  Allergies   Allergen Reactions    Sulfa Antibiotics Hives       Objective   First Vitals:   @VSFIRST2(5,8,6,7,9,11,14,10:FIRST)@    Current Vitals:   Blood Pressure: 118/66 (10/03/19 0956)  Pulse: 91 (10/03/19 0956)  Temperature: 98 2 °F (36 8 °C) (10/03/19 0956)  Temp Source: Tympanic (10/03/19 0956)  Respirations: 18 (10/03/19 0956)  Height: 5' 4 5" (163 8 cm) (10/03/19 0956)  Weight - Scale: 133 kg (293 lb 8 oz) (10/03/19 0956)    [unfilled]    Invasive Devices     None                 Physical Exam     Not in acute distress  Abdomen soft non tender non distended  Surgical incisions at trocar sites clean and dry no discharge no erythema no tenderness healing well    Lab Results: I have personally reviewed pertinent lab results  Imaging: I have personally reviewed pertinent reports  EKG, Pathology, and Other Studies: I have personally reviewed pertinent reports  Code Status: [unfilled]  Advance Directive and Living Will:      Power of :    POLST:      Assessment/Plan :      Patient is presenting for the first postoperative visit, patient hospital stay was uneventful without any complications, patient is doing well, has no complaints, is taking vitamins as instructed, currently tolerating the blenderized diet, will advance to soft diet  Patient will also be meeting with our dietician today to review her vitamin and mineral supplements and also go over her diet and emphasize postoperative commitment and compliance  The patient was also instructed to start exercising on a regular basis  However, I recommended no heavy lifting, or weight exercises for another 2 weeks  F/U in 4 weeks  Patient was instructed to call if develops nausea, vomiting, fever or chills

## 2019-10-03 NOTE — PROGRESS NOTES
Weight Management Nutrition Class     Diagnosis: Morbid Obesity    Bariatric Surgeon: Dr Ley Section    Surgery: Gastric Bypass Laparoscopic    Class: first post op note    Topics discussed today include:     fluid goals post op, protein goals post op, constipation, chew food well, exercise, avoidance of alcohol, PPI use, diet progression, hypoglycemia, dumping syndrome, protein supplems, vitamin/mineral supplements and calcium supplements    Patient was able to verbalize basic diet (protein, fluid, vitamin and mineral) recommendations and possible nutrition-related complications   Yes

## 2019-10-28 ENCOUNTER — OFFICE VISIT (OUTPATIENT)
Dept: FAMILY MEDICINE CLINIC | Facility: CLINIC | Age: 39
End: 2019-10-28
Payer: COMMERCIAL

## 2019-10-28 VITALS
WEIGHT: 283.8 LBS | HEART RATE: 83 BPM | HEIGHT: 65 IN | DIASTOLIC BLOOD PRESSURE: 80 MMHG | OXYGEN SATURATION: 97 % | BODY MASS INDEX: 47.28 KG/M2 | SYSTOLIC BLOOD PRESSURE: 122 MMHG

## 2019-10-28 DIAGNOSIS — F41.9 ANXIETY: ICD-10-CM

## 2019-10-28 PROCEDURE — 99213 OFFICE O/P EST LOW 20 MIN: CPT | Performed by: FAMILY MEDICINE

## 2019-10-28 RX ORDER — LORAZEPAM 1 MG/1
1 TABLET ORAL EVERY 8 HOURS PRN
Qty: 30 TABLET | Refills: 2 | Status: SHIPPED | OUTPATIENT
Start: 2019-10-28 | End: 2020-09-21 | Stop reason: SDUPTHER

## 2019-10-28 NOTE — PATIENT INSTRUCTIONS
incr anxiety since gastric bypass---possibly trintellix not getting absorbed--try cutting coating---if no response trial 20mg----?  Add wellbutrin in future

## 2019-10-28 NOTE — PROGRESS NOTES
[facility]        NAME: Bharati Schaefer is a 46 y o  female  : 1980    MRN: [id number]  DATE: 2019  TIME: 2:52 PM    Assessment and Plan   No primary diagnosis found  1  Anxiety  LORazepam (ATIVAN) 1 mg tablet    Vortioxetine HBr (TRINTELLIX) 20 MG tablet    DISCONTINUED: Vortioxetine HBr (TRINTELLIX) 20 MG tablet         Patient Instructions     Patient Instructions   incr anxiety since gastric bypass---possibly trintellix not getting absorbed--try cutting coating---if no response trial 20mg----? Add wellbutrin in future          Chief Complaint     Chief Complaint   Patient presents with    Follow-up     dicuss medications         History of Present Illness       incr anxiety past month      Review of Systems   Review of Systems   Respiratory: Negative  Cardiovascular: Negative  Gastrointestinal: Negative  Psychiatric/Behavioral: The patient is nervous/anxious            Current Medications       Current Outpatient Medications:     calcium citrate-vitamin d (CELEBRATE CALCIUM CITRATE) 500-500 MG-UNIT CHEW chewable tablet, , Disp: , Rfl:     Multiple Vitamins-Minerals (BARIATRIC FUSION) CHEW, , Disp: , Rfl:     acetaminophen (TYLENOL) 325 mg tablet, Take 3 tablets (975 mg total) by mouth every 8 (eight) hours (Patient not taking: Reported on 10/3/2019), Disp: 30 tablet, Rfl: 0    LORazepam (ATIVAN) 1 mg tablet, Take 1 tablet (1 mg total) by mouth every 8 (eight) hours as needed for anxiety, Disp: 30 tablet, Rfl: 2    Multiple Vitamins-Minerals (WOMENS MULTI PO), , Disp: , Rfl:     omeprazole (PriLOSEC) 20 mg delayed release capsule, Take 1 capsule (20 mg total) by mouth daily, Disp: 90 capsule, Rfl: 1    Vortioxetine HBr (TRINTELLIX) 20 MG tablet, Take 1 tablet (20 mg total) by mouth daily, Disp: 30 tablet, Rfl: 5    Current Allergies     Allergies as of 10/28/2019 - Reviewed 10/28/2019   Allergen Reaction Noted    Sulfa antibiotics Hives 2019 The following portions of the patient's history were reviewed and updated as appropriate: allergies, current medications, past family history, past medical history, past social history, past surgical history and problem list      Past Medical History:   Diagnosis Date    Anxiety     Depression     Morbid obesity ([address] )        Past Surgical History:   Procedure Laterality Date    OVARIAN CYST REMOVAL      [place] LAP GASTRIC BYPASS/YUNIOR-EN-Y N/A 9/23/2019    Procedure: LAPAROSCOPIC YUNIOR-EN-Y GASTRIC BYPASS AND INTRAOPERATIVE EGD;  Surgeon: Antonio Boykin MD;  Location: [facility] OR;  Service: Bariatrics    WISDOM TOOTH EXTRACTION         Family History   Problem Relation Age of Onset    Hypertension Mother     Obesity Mother     Obesity Father     Depression Father    Fredrich Mano Sister     No Known Problems Brother     Drug abuse Cousin     Stroke Maternal Aunt          Medications have been verified  Objective   /80   Pulse 83   Ht 5' 4 5" (1 638 m)   Wt 129 kg (283 lb 12 8 oz)   SpO2 97%   BMI 47 96 kg/m²        Physical Exam     Physical Exam   Cardiovascular: Normal heart sounds  Pulmonary/Chest: Breath sounds normal    Abdominal: Bowel sounds are normal    Psychiatric: She has a normal mood and affect  Her behavior is normal      BMI Counseling: Body mass index is 47 96 kg/m²  The BMI is above normal  Nutrition recommendations include reducing portion sizes

## 2019-11-01 ENCOUNTER — OFFICE VISIT (OUTPATIENT)
Dept: FAMILY MEDICINE CLINIC | Facility: CLINIC | Age: 39
End: 2019-11-01
Payer: COMMERCIAL

## 2019-11-01 VITALS
HEART RATE: 88 BPM | SYSTOLIC BLOOD PRESSURE: 124 MMHG | WEIGHT: 283 LBS | OXYGEN SATURATION: 98 % | BODY MASS INDEX: 47.15 KG/M2 | DIASTOLIC BLOOD PRESSURE: 84 MMHG | HEIGHT: 65 IN

## 2019-11-01 DIAGNOSIS — B37.3 CANDIDIASIS OF VAGINA: ICD-10-CM

## 2019-11-01 DIAGNOSIS — L01.00 IMPETIGO: Primary | ICD-10-CM

## 2019-11-01 PROCEDURE — 99213 OFFICE O/P EST LOW 20 MIN: CPT | Performed by: FAMILY MEDICINE

## 2019-11-01 PROCEDURE — 3008F BODY MASS INDEX DOCD: CPT | Performed by: FAMILY MEDICINE

## 2019-11-01 RX ORDER — FLUCONAZOLE 150 MG/1
150 TABLET ORAL ONCE
Qty: 1 TABLET | Refills: 1 | Status: SHIPPED | OUTPATIENT
Start: 2019-11-01 | End: 2019-11-01

## 2019-11-01 RX ORDER — DOXYCYCLINE HYCLATE 100 MG/1
100 CAPSULE ORAL EVERY 12 HOURS SCHEDULED
Qty: 30 CAPSULE | Refills: 1 | Status: SHIPPED | OUTPATIENT
Start: 2019-11-01 | End: 2019-11-11

## 2019-11-01 NOTE — PROGRESS NOTES
[id number] [employer]         NAME: Braxton Saldaña is a 46 y o  female  : 1980    MRN: [id number]  DATE: 2019  TIME: 11:09 AM    Assessment and Plan   Impetigo [L01 00]  1  Impetigo  doxycycline hyclate (VIBRAMYCIN) 100 mg capsule   2  Candidiasis of vagina  fluconazole (DIFLUCAN) 150 mg tablet       No problem-specific Assessment & Plan notes found for this encounter  Patient Instructions     Patient Instructions   Doxycycline twice daily for 2 weeks  Refill if still not improving  Diflucan prescription should you get yeast infections  Chief Complaint     Chief Complaint   Patient presents with    Bumps on left side     for 6 weeks          History of Present Illness       Pustules on abd and thighs-history of previous similar infections  Also has a history of yeast infections on antibiotics  Review of Systems   Review of Systems   Constitutional: Negative for appetite change, chills, diaphoresis and fever  HENT: Negative for ear pain, rhinorrhea, sinus pressure and sore throat  Eyes: Negative for discharge, redness and itching  Respiratory: Negative for cough, shortness of breath and wheezing  Cardiovascular: Negative for chest pain and palpitations  Rapid or slow heart rate   Gastrointestinal: Negative for nausea and vomiting  Skin: Positive for rash and wound           Current Medications       Current Outpatient Medications:     acetaminophen (TYLENOL) 325 mg tablet, Take 3 tablets (975 mg total) by mouth every 8 (eight) hours (Patient not taking: Reported on 10/3/2019), Disp: 30 tablet, Rfl: 0    calcium citrate-vitamin d (CELEBRATE CALCIUM CITRATE) 500-500 MG-UNIT CHEW chewable tablet, , Disp: , Rfl:     doxycycline hyclate (VIBRAMYCIN) 100 mg capsule, Take 1 capsule (100 mg total) by mouth every 12 (twelve) hours for 10 days, Disp: 30 capsule, Rfl: 1    LORazepam (ATIVAN) 1 mg tablet, Take 1 tablet (1 mg total) by mouth every 8 (eight) hours as needed for anxiety, Disp: 30 tablet, Rfl: 2    Multiple Vitamins-Minerals (BARIATRIC FUSION) CHEW, , Disp: , Rfl:     Multiple Vitamins-Minerals (WOMENS MULTI PO), , Disp: , Rfl:     omeprazole (PriLOSEC) 20 mg delayed release capsule, Take 1 capsule (20 mg total) by mouth daily, Disp: 90 capsule, Rfl: 1    Vortioxetine HBr (TRINTELLIX) 20 MG tablet, Take 1 tablet (20 mg total) by mouth daily, Disp: 30 tablet, Rfl: 5    Current Allergies     Allergies as of 11/01/2019 - Reviewed 11/01/2019   Allergen Reaction Noted    Sulfa antibiotics Hives 03/26/2019            The following portions of the patient's history were reviewed and updated as appropriate: allergies, current medications, past family history, past medical history, past social history, past surgical history and problem list      Past Medical History:   Diagnosis Date    Anxiety     Depression     Morbid obesity ([address] )        Past Surgical History:   Procedure Laterality Date    OVARIAN CYST REMOVAL      CT LAP GASTRIC BYPASS/YUNIOR-EN-Y N/A 9/23/2019    Procedure: LAPAROSCOPIC YUNIOR-EN-Y GASTRIC BYPASS AND INTRAOPERATIVE EGD;  Surgeon: [id number] Yogesh Ferrera MD;  Location: [facility];  Service: Bariatrics    WISDOM TOOTH EXTRACTION         Family History   Problem Relation Age of Onset    Hypertension Mother     Obesity Mother     Obesity Father     Depression Father    Arthor Deed Sister     No Known Problems Brother     Drug abuse Cousin     Stroke Maternal Aunt          Medications have been verified  Objective   /84   Pulse 88   Ht 5' 4 5" (1 638 m)   Wt 128 kg (283 lb)   SpO2 98%   BMI 47 83 kg/m²        Physical Exam     Physical Exam   Constitutional: She appears well-developed and well-nourished  No distress  HENT:   Head: Normocephalic and atraumatic  Right Ear: Tympanic membrane and external ear normal  No drainage  Left Ear: Tympanic membrane normal  No drainage     Mouth/Throat: No oropharyngeal exudate  Eyes: Conjunctivae and EOM are normal  Right eye exhibits no discharge  Left eye exhibits no discharge  Neck: Normal range of motion  Neck supple  No thyromegaly present  Cardiovascular: Normal rate, regular rhythm and normal heart sounds  Pulmonary/Chest: Effort normal  No respiratory distress  She has no wheezes  She has no rales  Lymphadenopathy:     She has no cervical adenopathy  Skin: Rash noted  Rash is pustular  Multiple pustules with some mild superficial ulcerations noted a several areas on the abdomen

## 2019-11-01 NOTE — PATIENT INSTRUCTIONS
Doxycycline twice daily for 2 weeks  Refill if still not improving  Diflucan prescription should you get yeast infections

## 2019-11-06 ENCOUNTER — TELEPHONE (OUTPATIENT)
Dept: BARIATRICS | Facility: CLINIC | Age: 39
End: 2019-11-06

## 2019-11-06 NOTE — TELEPHONE ENCOUNTER
Called patient to see if she is running late to her appointment or if she will have to r/s as the class is starting

## 2019-11-12 ENCOUNTER — TELEPHONE (OUTPATIENT)
Dept: BARIATRICS | Facility: CLINIC | Age: 39
End: 2019-11-12

## 2019-11-12 DIAGNOSIS — F41.9 ANXIETY: ICD-10-CM

## 2019-11-12 NOTE — TELEPHONE ENCOUNTER
Pt  Post op surgery was 9/23/19 c/o of nausea today, pt  Denies pain or any other sx  Pt  Was advised per Marcell Kaufman to go back to liquid diet for several days if nausea continues after 2 days to [facility] office again to be reevaluated   Pt  Diann Bangura

## 2019-11-13 RX ORDER — VORTIOXETINE 10 MG/1
TABLET, FILM COATED ORAL
Qty: 90 TABLET | Refills: 1 | Status: SHIPPED | OUTPATIENT
Start: 2019-11-13 | End: 2019-12-16

## 2019-12-16 ENCOUNTER — OFFICE VISIT (OUTPATIENT)
Dept: BARIATRICS | Facility: CLINIC | Age: 39
End: 2019-12-16

## 2019-12-16 VITALS
SYSTOLIC BLOOD PRESSURE: 124 MMHG | HEIGHT: 65 IN | BODY MASS INDEX: 41.57 KG/M2 | DIASTOLIC BLOOD PRESSURE: 80 MMHG | TEMPERATURE: 97.6 F | WEIGHT: 249.5 LBS | HEART RATE: 76 BPM

## 2019-12-16 DIAGNOSIS — Z98.84 BARIATRIC SURGERY STATUS: Primary | ICD-10-CM

## 2019-12-16 DIAGNOSIS — K91.2 POSTSURGICAL MALABSORPTION: ICD-10-CM

## 2019-12-16 DIAGNOSIS — E66.01 MORBID OBESITY (HCC): ICD-10-CM

## 2019-12-16 DIAGNOSIS — E66.9 OBESITY, CLASS II, BMI 35-39.9: ICD-10-CM

## 2019-12-16 DIAGNOSIS — F32.A DEPRESSION, UNSPECIFIED DEPRESSION TYPE: ICD-10-CM

## 2019-12-16 PROCEDURE — 99024 POSTOP FOLLOW-UP VISIT: CPT | Performed by: PHYSICIAN ASSISTANT

## 2019-12-16 NOTE — PROGRESS NOTES
Assessment/Plan:    Bariatric surgery status  -s/p Mani-En-Y Gastric Bypass with Dr Kieran Stein on 9/23/209  Overall doing Fair  Initial:326 7 lb  Current:249 5 lb  EWL:44% -which is below average but as expected with higher starting bmi  Suleiman:Current  Current BMI is Body mass index is 42 17 kg/m²  Tolerating a regular diet-yes  Eating at least 60 grams of protein per day-yes  Following 30/60 minute rule with liquids-yes  Drinking at least 64 ounces of fluid per day-yes  Drinking carbonated beverages-no  Sufficient exercise-primarily walking-encouraged increase as tolerated and add some weight resistance  Using NSAIDs regularly-no  Using nicotine-no  Using alcohol-no      Postsurgical malabsorption  Malabsorption- patient is at risk for malabsorption of vitamins/minerals secondary to malabsorption from her procedure and restriction of intakes  Reviewed current supplements and advised on same  She is taking 4 bariatric fusion and one calcium citrate with D-wants to switch to capsules so advised on one capsule fusion mvi and 2-3 calcium contingent on her dairy intakes-she is due for routine labs-will order now    Morbid obesity ([address] )  Improved from bmi of 49 6 at her first post-op visit with her surgeon to current bmi of 42 2    Depression  Notes her antidepressant dose needed to be adjusted up -but she feels improved on this dose/followed by another provider       Diagnoses and all orders for this visit:    Bariatric surgery status  -     CBC and Platelet; Future  -     Comprehensive metabolic panel; Future  -     Copper Level; Future  -     Ferritin; Future  -     Folate; Future  -     Iron Saturation %; Future  -     PTH, intact; Future  -     Vitamin A; Future  -     Vitamin B1, whole blood; Future  -     Vitamin B12; Future  -     Vitamin D 25 hydroxy; Future  -     Zinc; Future  -     Vitamin A    Postsurgical malabsorption  -     CBC and Platelet; Future  -     Comprehensive metabolic panel;  Future  - Copper Level; Future  -     Ferritin; Future  -     Folate; Future  -     Iron Saturation %; Future  -     PTH, intact; Future  -     Vitamin A; Future  -     Vitamin B1, whole blood; Future  -     Vitamin B12; Future  -     Vitamin D 25 hydroxy; Future  -     Zinc; Future  -     Vitamin A    Obesity, Class II, BMI 35-39 9  -     CBC and Platelet; Future  -     Comprehensive metabolic panel; Future  -     Copper Level; Future  -     Ferritin; Future  -     Folate; Future  -     Iron Saturation %; Future  -     PTH, intact; Future  -     Vitamin A; Future  -     Vitamin B1, whole blood; Future  -     Vitamin B12; Future  -     Vitamin D 25 hydroxy; Future  -     Zinc; Future  -     Vitamin A    Morbid obesity (HCC)    Depression, unspecified depression type          Subjective:      Patient ID: Bharati Schaefer is a 46 y o  female  She is here in routine follow-up  She is taking bariatric vitamins and is tolerating a regular diet  She is regularly exercising and feels good  She has lost weight from her last visit  She has no complaints today  She notes her antidepressant was increased between visits and she feels improved on current rx      The following portions of the patient's history were reviewed and updated as appropriate: allergies, current medications, past family history, past medical history, past social history, past surgical history and problem list     Review of Systems   Constitutional: Negative for chills and fever  Unexpected weight change: planned weight loss  Respiratory: Negative for shortness of breath and wheezing  Cardiovascular: Negative for chest pain and palpitations  Gastrointestinal: Negative for abdominal pain, constipation, diarrhea, nausea and vomiting  Psychiatric/Behavioral: Negative for suicidal ideas (no complait of anxiety some depression improved on medication per patient)           Objective:      /80 (BP Location: Right arm, Patient Position: Sitting)   Pulse 76   Temp 97 6 °F (36 4 °C)   Ht 5' 4 5" (1 638 m)   Wt 113 kg (249 lb 8 oz)   BMI 42 17 kg/m²          Physical Exam   Constitutional: She is oriented to person, place, and time  She appears well-developed and well-nourished  HENT:   Mouth/Throat: Oropharynx is clear and moist    Eyes: Conjunctivae are normal  No scleral icterus  Cardiovascular: Normal rate, regular rhythm and normal heart sounds  Pulmonary/Chest: Effort normal and breath sounds normal    Abdominal: Soft  There is no tenderness  No incisional hernias appreciated   Musculoskeletal:   Normal gait   Neurological: She is alert and oriented to person, place, and time  Skin: Skin is warm and dry  Psychiatric: She has a normal mood and affect  Nursing note and vitals reviewed  GOALS: Continued weight loss with good nutrition intakes    Normal vitamin and mineral levels  Exercise as tolerated    BARRIERS: none identified

## 2019-12-16 NOTE — ASSESSMENT & PLAN NOTE
-s/p Mani-En-Y Gastric Bypass with Dr Caroline Brown on 9/23/209  Overall doing Fair  Initial:326 7 lb  Current:249 5 lb  EWL:44% -which is below average but as expected with higher starting bmi  Suleiman:Current  Current BMI is Body mass index is 42 17 kg/m²      Tolerating a regular diet-yes  Eating at least 60 grams of protein per day-yes  Following 30/60 minute rule with liquids-yes  Drinking at least 64 ounces of fluid per day-yes  Drinking carbonated beverages-no  Sufficient exercise-primarily walking-encouraged increase as tolerated and add some weight resistance  Using NSAIDs regularly-no  Using nicotine-no  Using alcohol-no

## 2019-12-16 NOTE — ASSESSMENT & PLAN NOTE
Notes her antidepressant dose needed to be adjusted up -but she feels improved on this dose/followed by another provider

## 2019-12-16 NOTE — ASSESSMENT & PLAN NOTE
Malabsorption- patient is at risk for malabsorption of vitamins/minerals secondary to malabsorption from her procedure and restriction of intakes  Reviewed current supplements and advised on same  She is taking 4 bariatric fusion and one calcium citrate with D-wants to switch to capsules so advised on one capsule fusion mvi and 2-3 calcium contingent on her dairy intakes-she is due for routine labs-will order now

## 2019-12-16 NOTE — PATIENT INSTRUCTIONS
It was great meeting you today! Keep up the good work    Follow-up in 3 months  We kindly ask that you arrive 15 minutes before your scheduled appointment time with your provider to allow for our staff to room you and check your vital signs and update your chart  We thank you for your patience at your visit  Follow diet as discussed  Get lab work done prior to next office visit  It is recommended to check with your insurance BEFORE getting labs done to make sure they are covered by your policy  Make sure to HOLD any multivitamins that may contain biotin and any biotin supplements FOR 5 DAYS before any labs since it can affect the results  Follow vitamin  and mineral recommendations as reviewed with you  Bariatric vitamins are highly recommended  Vitamins are important for a life-time to avoid low levels which can lead to other medical problems  Exercise as tolerated  Call our office if you have any problems with abdominal pain especially associated with fever, chills, nausea, vomiting or any other concerns  All  Post-bariatric surgery patients should be aware that very small quantities of any alcohol  can cause impairment and it is very possible not to feel the effect  The effect can be in the system for several hours  It is also a stomach irritant  It is advised to AVOID alcohol, Nonsteroidal antiinflammatory drugs (NSAIDS) and nicotine of all forms   Any of these can cause stomach irritation/pain  Recommendation : Most, if not all post-gastric surgery patients would benefit from having a regular counselor for at least 2 years post-op to help with need for multiple life-style changes  If you want to do this and need help finding a regular counselor you can also make a follow-up with our  to help you find one

## 2019-12-31 LAB
25(OH)D3+25(OH)D2 SERPL-MCNC: 38.2 NG/ML (ref 30–100)
ALBUMIN SERPL-MCNC: 4.1 G/DL (ref 3.5–5.5)
ALBUMIN/GLOB SERPL: 1.6 {RATIO} (ref 1.2–2.2)
ALP SERPL-CCNC: 54 IU/L (ref 39–117)
ALT SERPL-CCNC: 47 IU/L (ref 0–32)
AST SERPL-CCNC: 31 IU/L (ref 0–40)
BASOPHILS # BLD AUTO: 0 X10E3/UL (ref 0–0.2)
BASOPHILS NFR BLD AUTO: 1 %
BILIRUB SERPL-MCNC: 0.3 MG/DL (ref 0–1.2)
BUN SERPL-MCNC: 8 MG/DL (ref 6–20)
BUN/CREAT SERPL: 11 (ref 9–23)
CALCIUM SERPL-MCNC: 9.7 MG/DL (ref 8.7–10.2)
CHLORIDE SERPL-SCNC: 103 MMOL/L (ref 96–106)
CO2 SERPL-SCNC: 23 MMOL/L (ref 20–29)
COPPER SERPL-MCNC: 228 UG/DL (ref 72–166)
CREAT SERPL-MCNC: 0.7 MG/DL (ref 0.57–1)
EOSINOPHIL # BLD AUTO: 0.1 X10E3/UL (ref 0–0.4)
EOSINOPHIL NFR BLD AUTO: 1 %
ERYTHROCYTE [DISTWIDTH] IN BLOOD BY AUTOMATED COUNT: 14.3 % (ref 12.3–15.4)
FERRITIN SERPL-MCNC: 137 NG/ML (ref 15–150)
FOLATE SERPL-MCNC: 16.7 NG/ML
GLOBULIN SER-MCNC: 2.5 G/DL (ref 1.5–4.5)
GLUCOSE SERPL-MCNC: 102 MG/DL (ref 65–99)
HCT VFR BLD AUTO: 39.3 % (ref 34–46.6)
HGB BLD-MCNC: 13.6 G/DL (ref 11.1–15.9)
IMM GRANULOCYTES # BLD: 0 X10E3/UL (ref 0–0.1)
IMM GRANULOCYTES NFR BLD: 0 %
IRON SATN MFR SERPL: 32 % (ref 15–55)
IRON SERPL-MCNC: 93 UG/DL (ref 27–159)
LYMPHOCYTES # BLD AUTO: 2.4 X10E3/UL (ref 0.7–3.1)
LYMPHOCYTES NFR BLD AUTO: 41 %
MCH RBC QN AUTO: 30.4 PG (ref 26.6–33)
MCHC RBC AUTO-ENTMCNC: 34.6 G/DL (ref 31.5–35.7)
MCV RBC AUTO: 88 FL (ref 79–97)
MONOCYTES # BLD AUTO: 0.3 X10E3/UL (ref 0.1–0.9)
MONOCYTES NFR BLD AUTO: 5 %
NEUTROPHILS # BLD AUTO: 3.1 X10E3/UL (ref 1.4–7)
NEUTROPHILS NFR BLD AUTO: 52 %
PLATELET # BLD AUTO: 259 X10E3/UL (ref 150–450)
POTASSIUM SERPL-SCNC: 3.5 MMOL/L (ref 3.5–5.2)
PROT SERPL-MCNC: 6.6 G/DL (ref 6–8.5)
PTH-INTACT SERPL-MCNC: 29 PG/ML (ref 15–65)
RBC # BLD AUTO: 4.47 X10E6/UL (ref 3.77–5.28)
SL AMB EGFR AFRICAN AMERICAN: 127 ML/MIN/1.73
SL AMB EGFR NON AFRICAN AMERICAN: 110 ML/MIN/1.73
SODIUM SERPL-SCNC: 144 MMOL/L (ref 134–144)
TIBC SERPL-MCNC: 294 UG/DL (ref 250–450)
UIBC SERPL-MCNC: 201 UG/DL (ref 131–425)
VIT A SERPL-MCNC: 42.4 UG/DL (ref 18.9–57.3)
VIT B12 SERPL-MCNC: 367 PG/ML (ref 232–1245)
WBC # BLD AUTO: 5.9 X10E3/UL (ref 3.4–10.8)
ZINC SERPL-MCNC: 101 UG/DL (ref 56–134)

## 2020-01-05 LAB — VIT B1 BLD-SCNC: 139.6 NMOL/L (ref 66.5–200)

## 2020-01-06 DIAGNOSIS — Z98.84 BARIATRIC SURGERY STATUS: ICD-10-CM

## 2020-01-06 DIAGNOSIS — R78.79 HIGH BLOOD COPPER LEVEL: Primary | ICD-10-CM

## 2020-01-23 LAB
CERULOPLASMIN SERPL-MCNC: 49.1 MG/DL (ref 19–39)
COPPER SERPL-MCNC: 180 UG/DL (ref 72–166)

## 2020-01-30 LAB
COPPER 24H UR-MRATE: 11 UG/24 HR (ref 3–35)
COPPER UR-MCNC: 9 UG/L
COPPER/CREAT UR: 10 UG/G CREAT (ref 0–49)
CREAT UR-MCNC: 0.92 G/L (ref 0.3–3)

## 2020-03-09 ENCOUNTER — TELEPHONE (OUTPATIENT)
Dept: FAMILY MEDICINE CLINIC | Facility: CLINIC | Age: 40
End: 2020-03-09

## 2020-03-09 NOTE — TELEPHONE ENCOUNTER
REFERRAL NEEDED  [facility]  NEW INS- [employer]STONE [facility] [id number]    FOLLOW UP GASTRIC BYPASS

## 2020-03-10 NOTE — TELEPHONE ENCOUNTER
Valid   Referral #: [id number]  Effective: 03/10/2020  Expires: 06/07/2020    Referral [id number] has been successfully submitted  Joey Martinez [address] [place] [address] [phone]      PATIENT'S INSURANCE  Member ID: [id number]     PRIMARY CARE PHYSICIAN  [facility]  NPI: [id number]  From  [facility]  NPI: [id number]  [facility]   [facility], [address]   To  [place] [address] [facility]  NPI: [id number]  Wendy, [address] [place] [address] 2, [place], [place] [address], [facility]   ,      Service Type:  Medical Care (Consult and Treat)   Place of Service:  Office      Note to Patient   This referral is valid at any location for the above group

## 2020-03-21 DIAGNOSIS — E66.01 MORBID (SEVERE) OBESITY DUE TO EXCESS CALORIES (HCC): ICD-10-CM

## 2020-03-23 RX ORDER — OMEPRAZOLE 20 MG/1
CAPSULE, DELAYED RELEASE ORAL
Qty: 90 CAPSULE | Refills: 1 | Status: SHIPPED | OUTPATIENT
Start: 2020-03-23 | End: 2020-07-10

## 2020-04-10 ENCOUNTER — TELEPHONE (OUTPATIENT)
Dept: BARIATRICS | Facility: CLINIC | Age: 40
End: 2020-04-10

## 2020-04-29 ENCOUNTER — TELEMEDICINE (OUTPATIENT)
Dept: FAMILY MEDICINE CLINIC | Facility: CLINIC | Age: 40
End: 2020-04-29
Payer: COMMERCIAL

## 2020-04-29 DIAGNOSIS — F32.A DEPRESSION, UNSPECIFIED DEPRESSION TYPE: Primary | ICD-10-CM

## 2020-04-29 PROCEDURE — 99213 OFFICE O/P EST LOW 20 MIN: CPT | Performed by: FAMILY MEDICINE

## 2020-04-29 RX ORDER — ESCITALOPRAM OXALATE 10 MG/1
10 TABLET ORAL DAILY
Qty: 90 TABLET | Refills: 3 | Status: SHIPPED | OUTPATIENT
Start: 2020-04-29 | End: 2021-04-26

## 2020-06-01 ENCOUNTER — TELEPHONE (OUTPATIENT)
Dept: FAMILY MEDICINE CLINIC | Facility: CLINIC | Age: 40
End: 2020-06-01

## 2020-06-01 DIAGNOSIS — F41.9 ANXIETY: Primary | ICD-10-CM

## 2020-06-01 RX ORDER — BUPROPION HYDROCHLORIDE 75 MG/1
75 TABLET ORAL 2 TIMES DAILY
Qty: 60 TABLET | Refills: 5 | Status: SHIPPED | OUTPATIENT
Start: 2020-06-01 | End: 2020-11-22

## 2020-06-01 RX ORDER — BUPROPION HYDROCHLORIDE 150 MG/1
150 TABLET ORAL EVERY MORNING
Qty: 90 TABLET | Refills: 3 | Status: SHIPPED | OUTPATIENT
Start: 2020-06-01 | End: 2020-06-01

## 2020-07-10 ENCOUNTER — OFFICE VISIT (OUTPATIENT)
Dept: FAMILY MEDICINE CLINIC | Facility: CLINIC | Age: 40
End: 2020-07-10
Payer: COMMERCIAL

## 2020-07-10 VITALS
OXYGEN SATURATION: 97 % | SYSTOLIC BLOOD PRESSURE: 122 MMHG | BODY MASS INDEX: 33.32 KG/M2 | WEIGHT: 200 LBS | HEIGHT: 65 IN | DIASTOLIC BLOOD PRESSURE: 80 MMHG | HEART RATE: 92 BPM | TEMPERATURE: 97.7 F

## 2020-07-10 DIAGNOSIS — L72.3 SEBACEOUS CYST: Primary | ICD-10-CM

## 2020-07-10 PROCEDURE — 99213 OFFICE O/P EST LOW 20 MIN: CPT | Performed by: FAMILY MEDICINE

## 2020-07-10 PROCEDURE — 3008F BODY MASS INDEX DOCD: CPT | Performed by: FAMILY MEDICINE

## 2020-07-10 PROCEDURE — 1036F TOBACCO NON-USER: CPT | Performed by: FAMILY MEDICINE

## 2020-07-10 RX ORDER — CEPHALEXIN 500 MG/1
500 CAPSULE ORAL EVERY 8 HOURS SCHEDULED
Qty: 21 CAPSULE | Refills: 0 | Status: SHIPPED | OUTPATIENT
Start: 2020-07-10 | End: 2020-07-17

## 2020-07-10 NOTE — PROGRESS NOTES
[facility]        NAME: Wilfred Corcoran is a 46 y o  female  : 1980    MRN: [id number]  DATE: July 10, 2020  TIME: 8:00 AM    Assessment and Plan   Sebaceous cyst [L72 3]  1  Sebaceous cyst  cephalexin (KEFLEX) 500 mg capsule         Patient Instructions     Patient Instructions   sched surg          Chief Complaint     Chief Complaint   Patient presents with    Cyst     Right side of body around bra strap (inflamed)         History of Present Illness       C/o lesion/cyst R axilla      Review of Systems   Review of Systems   Respiratory: Negative  Cardiovascular: Negative            Current Medications       Current Outpatient Medications:     acetaminophen (TYLENOL) 325 mg tablet, Take 3 tablets (975 mg total) by mouth every 8 (eight) hours, Disp: 30 tablet, Rfl: 0    buPROPion (WELLBUTRIN) 75 mg tablet, Take 1 tablet (75 mg total) by mouth 2 (two) times a day, Disp: 60 tablet, Rfl: 5    calcium citrate-vitamin d (CELEBRATE CALCIUM CITRATE) 500-500 MG-UNIT CHEW chewable tablet, , Disp: , Rfl:     escitalopram (LEXAPRO) 10 mg tablet, Take 1 tablet (10 mg total) by mouth daily, Disp: 90 tablet, Rfl: 3    LORazepam (ATIVAN) 1 mg tablet, Take 1 tablet (1 mg total) by mouth every 8 (eight) hours as needed for anxiety, Disp: 30 tablet, Rfl: 2    Multiple Vitamins-Minerals (BARIATRIC FUSION) CHEW, , Disp: , Rfl:     cephalexin (KEFLEX) 500 mg capsule, Take 1 capsule (500 mg total) by mouth every 8 (eight) hours for 7 days, Disp: 21 capsule, Rfl: 0    Current Allergies     Allergies as of 07/10/2020 - Reviewed 07/10/2020   Allergen Reaction Noted    Sulfa antibiotics Hives 2019            The following portions of the patient's history were reviewed and updated as appropriate: allergies, current medications, past family history, past medical history, past social history, past surgical history and problem list      Past Medical History:   Diagnosis Date    Anxiety     Depression     Morbid [address] [address] )        Past Surgical History:   Procedure Laterality Date    OVARIAN CYST REMOVAL      [place] LAP GASTRIC BYPASS/YUNIOR-EN-Y N/A 9/23/2019    Procedure: LAPAROSCOPIC YUNIOR-EN-Y GASTRIC BYPASS AND INTRAOPERATIVE EGD;  Surgeon: Roseann Helms MD;  Location[facility];  Service: Bariatrics    WISDOM TOOTH EXTRACTION         Family History   Problem Relation Age of Onset    Hypertension Mother     Obesity Mother     Obesity Father     Depression Father    [place] Rishabh Sister     No Known Problems Brother     Drug abuse Cousin     Stroke Maternal Aunt          Medications have been verified  Objective   /80   Pulse 92   Temp 97 7 °F (36 5 °C)   Ht 5' 4 5" (1 638 m)   Wt 90 7 kg (200 lb)   SpO2 97%   BMI 33 80 kg/m²        Physical Exam     Physical Exam   Cardiovascular: Normal heart sounds     Pulmonary/Chest: Breath sounds normal    Skin:   Inflamed seb cyst R axilla

## 2020-07-13 ENCOUNTER — PROCEDURE VISIT (OUTPATIENT)
Dept: FAMILY MEDICINE CLINIC | Facility: CLINIC | Age: 40
End: 2020-07-13
Payer: COMMERCIAL

## 2020-07-13 DIAGNOSIS — L72.3 SEBACEOUS CYST: Primary | ICD-10-CM

## 2020-07-13 PROCEDURE — 10060 I&D ABSCESS SIMPLE/SINGLE: CPT | Performed by: FAMILY MEDICINE

## 2020-07-13 NOTE — PROGRESS NOTES
Incision and Drainage  Date/Time: 7/13/2020 3:39 PM  Performed by: Ricardo Reynoso MD  Authorized by: Ricardo Reynoso MD     Patient location:  Clinic  Other Assisting Provider: No    Consent:     Consent obtained:  Verbal    Consent given by:  Patient    Risks discussed:  Bleeding    Alternatives discussed:  No treatment  Universal protocol:     Procedure explained and questions answered to patient or proxy's satisfaction: yes    Location:     Type:  Abscess    Location:  Trunk    Trunk location:  Back  Pre-procedure details:     Skin preparation:  Antiseptic wash  Post-procedure details:     Patient tolerance of procedure:   Tolerated well, no immediate complications  Comments:      inflammed seb cyst R axilla--I/D--packed--cyst wall/sebum/necrotic tissue removed

## 2020-07-15 ENCOUNTER — OFFICE VISIT (OUTPATIENT)
Dept: FAMILY MEDICINE CLINIC | Facility: CLINIC | Age: 40
End: 2020-07-15
Payer: COMMERCIAL

## 2020-07-15 VITALS
OXYGEN SATURATION: 96 % | HEIGHT: 64 IN | DIASTOLIC BLOOD PRESSURE: 78 MMHG | HEART RATE: 80 BPM | SYSTOLIC BLOOD PRESSURE: 126 MMHG | TEMPERATURE: 98.2 F | WEIGHT: 199 LBS | BODY MASS INDEX: 33.97 KG/M2

## 2020-07-15 DIAGNOSIS — Z00.00 WELLNESS EXAMINATION: Primary | ICD-10-CM

## 2020-07-15 PROCEDURE — 3008F BODY MASS INDEX DOCD: CPT | Performed by: FAMILY MEDICINE

## 2020-07-15 PROCEDURE — 99395 PREV VISIT EST AGE 18-39: CPT | Performed by: FAMILY MEDICINE

## 2020-07-15 NOTE — PROGRESS NOTES
HPI:  Josie Condon is a 46 y o  female here for her yearly health maintenance exam    Patient Active Problem List   Diagnosis    Depression    Class 3 severe obesity without serious comorbidity with body mass index (BMI) of 50 0 to 59 9 in adult [facility])    Preop cardiovascular exam    Morbid [address] ([facility] [address] )    Bariatric surgery status    Postsurgical malabsorption    Sebaceous cyst     Past Medical History:   Diagnosis Date    Anxiety     Depression     Morbid obesity ([facility] [address] )        1  Advanced Directive: n     2  Durable Power of  for Healthcare: n     3  Social History:           Drug and alcohol History: n                  4  Immunizations up to date: y                 Lifestyle:                           Healthy Diet:y                          Alcohol Use:n                          Tobacco Use:n                          Regular exercise:y                          Weight concerns:y                               5   Over the past 2 weeks, how often have you been bothered by the following:              Little interest or pleasure in doing things:n              Felling down, depressed or hopeless:n       Current Outpatient Medications   Medication Sig Dispense Refill    acetaminophen (TYLENOL) 325 mg tablet Take 3 tablets (975 mg total) by mouth every 8 (eight) hours 30 tablet 0    buPROPion (WELLBUTRIN) 75 mg tablet Take 1 tablet (75 mg total) by mouth 2 (two) times a day 60 tablet 5    calcium citrate-vitamin d (CELEBRATE CALCIUM CITRATE) 500-500 MG-UNIT CHEW chewable tablet       cephalexin (KEFLEX) 500 mg capsule Take 1 capsule (500 mg total) by mouth every 8 (eight) hours for 7 days 21 capsule 0    escitalopram (LEXAPRO) 10 mg tablet Take 1 tablet (10 mg total) by mouth daily 90 tablet 3    LORazepam (ATIVAN) 1 mg tablet Take 1 tablet (1 mg total) by mouth every 8 (eight) hours as needed for anxiety 30 tablet 2    Multiple Vitamins-Minerals (BARIATRIC FUSION) CHEW        No current facility-administered medications for this visit  Allergies   Allergen Reactions    Sulfa Antibiotics Hives     Immunization History   Administered Date(s) Administered    INFLUENZA 10/19/2018, 10/25/2019    Influenza Injectable, MDCK, Preservative Free, Quadrivalent, 0 5 mL 10/19/2018    influenza, injectable, quadrivalent 10/25/2019       Patient Care Team:  Tere Rodriguez MD as PCP - General (Family Medicine)    Review of Systems   Constitutional: Negative for fatigue, fever and unexpected weight change  HENT: Negative for congestion, sinus pain and sore throat  Eyes: Negative for visual disturbance  Respiratory: Negative for shortness of breath and wheezing  Cardiovascular: Negative for chest pain and palpitations  Gastrointestinal: Negative for abdominal pain, nausea and vomiting  Musculoskeletal: Negative  Negative for arthralgias and myalgias  Neurological: Negative for syncope, weakness and numbness  Psychiatric/Behavioral: Negative  Negative for confusion, dysphoric mood and suicidal ideas  Physical Exam :  Physical Exam   Constitutional: She is oriented to person, place, and time  Vital signs are normal  She appears well-developed and well-nourished  HENT:   Right Ear: Ear canal normal  Tympanic membrane is not injected  Left Ear: Ear canal normal  Tympanic membrane is not injected  Nose: Nose normal    Mouth/Throat: Oropharynx is clear and moist    Eyes: Pupils are equal, round, and reactive to light  Conjunctivae and EOM are normal  Right eye exhibits no discharge  Left eye exhibits no discharge  Neck: Normal range of motion  Neck supple  No thyromegaly present  Cardiovascular: Normal rate, regular rhythm and normal heart sounds  No murmur heard  Pulmonary/Chest: Effort normal and breath sounds normal  No respiratory distress  She has no wheezes  Abdominal: Soft  Bowel sounds are normal  She exhibits no distension  There is no tenderness  Musculoskeletal: Normal range of motion  Lymphadenopathy:     She has no cervical adenopathy  Neurological: She is alert and oriented to person, place, and time  She has normal strength and normal reflexes  She is not disoriented  No sensory deficit  Gait normal    Skin: Skin is warm and dry  Psychiatric: She has a normal mood and affect  Her speech is normal and behavior is normal  Judgment and thought content normal  Cognition and memory are normal          Assessment and Plan:  1   Wellness examination         Health Maintenance Due   Topic Date Due    HIV Screening  08/08/1996    Annual Physical  08/08/1999    Cervical Cancer Screening  08/08/2002    Influenza Vaccine  07/01/2020    BMI: Followup Plan  10/28/2020

## 2020-09-21 DIAGNOSIS — F41.9 ANXIETY: ICD-10-CM

## 2020-09-23 RX ORDER — LORAZEPAM 1 MG/1
1 TABLET ORAL EVERY 8 HOURS PRN
Qty: 30 TABLET | Refills: 1 | Status: SHIPPED | OUTPATIENT
Start: 2020-09-23 | End: 2021-04-26

## 2020-10-19 ENCOUNTER — TELEPHONE (OUTPATIENT)
Dept: FAMILY MEDICINE CLINIC | Facility: CLINIC | Age: 40
End: 2020-10-19

## 2020-11-10 ENCOUNTER — TELEPHONE (OUTPATIENT)
Dept: FAMILY MEDICINE CLINIC | Facility: CLINIC | Age: 40
End: 2020-11-10

## 2020-11-10 ENCOUNTER — OFFICE VISIT (OUTPATIENT)
Dept: BARIATRICS | Facility: CLINIC | Age: 40
End: 2020-11-10
Payer: COMMERCIAL

## 2020-11-10 VITALS
BODY MASS INDEX: 30.32 KG/M2 | DIASTOLIC BLOOD PRESSURE: 80 MMHG | TEMPERATURE: 97.3 F | WEIGHT: 182 LBS | SYSTOLIC BLOOD PRESSURE: 122 MMHG | HEART RATE: 83 BPM | HEIGHT: 65 IN

## 2020-11-10 DIAGNOSIS — R10.13 EPIGASTRIC PAIN: ICD-10-CM

## 2020-11-10 DIAGNOSIS — Z98.84 BARIATRIC SURGERY STATUS: ICD-10-CM

## 2020-11-10 DIAGNOSIS — Z48.815 ENCOUNTER FOR SURGICAL AFTERCARE FOLLOWING SURGERY OF DIGESTIVE SYSTEM: ICD-10-CM

## 2020-11-10 DIAGNOSIS — R10.11 RIGHT UPPER QUADRANT PAIN: Primary | ICD-10-CM

## 2020-11-10 DIAGNOSIS — K91.2 POSTSURGICAL MALABSORPTION: ICD-10-CM

## 2020-11-10 DIAGNOSIS — E66.9 OBESITY, CLASS I, BMI 30-34.9: ICD-10-CM

## 2020-11-10 PROBLEM — E66.813 CLASS 3 SEVERE OBESITY WITHOUT SERIOUS COMORBIDITY WITH BODY MASS INDEX (BMI) OF 50.0 TO 59.9 IN ADULT (HCC): Status: RESOLVED | Noted: 2019-03-26 | Resolved: 2020-11-10

## 2020-11-10 PROBLEM — E66.01 CLASS 3 SEVERE OBESITY WITHOUT SERIOUS COMORBIDITY WITH BODY MASS INDEX (BMI) OF 50.0 TO 59.9 IN ADULT (HCC): Status: RESOLVED | Noted: 2019-03-26 | Resolved: 2020-11-10

## 2020-11-10 PROBLEM — E66.811 OBESITY, CLASS I, BMI 30-34.9: Status: ACTIVE | Noted: 2020-11-10

## 2020-11-10 PROBLEM — E66.01 MORBID OBESITY (HCC): Status: RESOLVED | Noted: 2019-08-08 | Resolved: 2020-11-10

## 2020-11-10 PROCEDURE — 99214 OFFICE O/P EST MOD 30 MIN: CPT | Performed by: FAMILY MEDICINE

## 2020-11-10 PROCEDURE — 3008F BODY MASS INDEX DOCD: CPT | Performed by: FAMILY MEDICINE

## 2020-11-10 PROCEDURE — 1036F TOBACCO NON-USER: CPT | Performed by: FAMILY MEDICINE

## 2020-11-10 RX ORDER — OMEPRAZOLE 20 MG/1
20 CAPSULE, DELAYED RELEASE ORAL DAILY
Qty: 90 CAPSULE | Refills: 0 | Status: SHIPPED | OUTPATIENT
Start: 2020-11-10 | End: 2021-01-20

## 2020-11-12 ENCOUNTER — HOSPITAL ENCOUNTER (OUTPATIENT)
Dept: ULTRASOUND IMAGING | Facility: HOSPITAL | Age: 40
Discharge: HOME | End: 2020-11-12
Payer: COMMERCIAL

## 2020-11-12 DIAGNOSIS — R10.11 RIGHT UPPER QUADRANT PAIN: ICD-10-CM

## 2020-11-12 PROCEDURE — 76705 ECHO EXAM OF ABDOMEN: CPT

## 2020-11-13 ENCOUNTER — TELEPHONE (OUTPATIENT)
Dept: BARIATRICS | Facility: CLINIC | Age: 40
End: 2020-11-13

## 2020-11-20 DIAGNOSIS — F41.9 ANXIETY: ICD-10-CM

## 2020-11-22 RX ORDER — BUPROPION HYDROCHLORIDE 75 MG/1
TABLET ORAL
Qty: 60 TABLET | Refills: 5 | Status: SHIPPED | OUTPATIENT
Start: 2020-11-22 | End: 2021-05-25

## 2020-12-07 ENCOUNTER — TELEPHONE (OUTPATIENT)
Dept: FAMILY MEDICINE CLINIC | Facility: CLINIC | Age: 40
End: 2020-12-07

## 2020-12-07 ENCOUNTER — TELEPHONE (OUTPATIENT)
Dept: BARIATRICS | Facility: CLINIC | Age: 40
End: 2020-12-07

## 2020-12-08 ENCOUNTER — ANESTHESIA EVENT (OUTPATIENT)
Dept: GASTROENTEROLOGY | Facility: HOSPITAL | Age: 40
End: 2020-12-08

## 2020-12-09 ENCOUNTER — ANESTHESIA (OUTPATIENT)
Dept: GASTROENTEROLOGY | Facility: HOSPITAL | Age: 40
End: 2020-12-09

## 2020-12-09 ENCOUNTER — HOSPITAL ENCOUNTER (OUTPATIENT)
Dept: GASTROENTEROLOGY | Facility: HOSPITAL | Age: 40
Setting detail: OUTPATIENT SURGERY
Discharge: HOME | End: 2020-12-09
Attending: SURGERY | Admitting: SURGERY
Payer: COMMERCIAL

## 2020-12-09 VITALS
SYSTOLIC BLOOD PRESSURE: 109 MMHG | WEIGHT: 182 LBS | BODY MASS INDEX: 31.07 KG/M2 | OXYGEN SATURATION: 100 % | TEMPERATURE: 98.5 F | HEIGHT: 64 IN | HEART RATE: 62 BPM | DIASTOLIC BLOOD PRESSURE: 64 MMHG | RESPIRATION RATE: 16 BRPM

## 2020-12-09 VITALS — HEART RATE: 63 BPM

## 2020-12-09 DIAGNOSIS — E66.01 MORBID OBESITY (HCC): ICD-10-CM

## 2020-12-09 LAB
EXT PREGNANCY TEST URINE: NEGATIVE
EXT. CONTROL: NORMAL

## 2020-12-09 PROCEDURE — 43239 EGD BIOPSY SINGLE/MULTIPLE: CPT | Performed by: SURGERY

## 2020-12-09 PROCEDURE — 88305 TISSUE EXAM BY PATHOLOGIST: CPT | Performed by: PATHOLOGY

## 2020-12-09 PROCEDURE — 81025 URINE PREGNANCY TEST: CPT | Performed by: ANESTHESIOLOGY

## 2020-12-09 RX ORDER — SODIUM CHLORIDE 9 MG/ML
125 INJECTION, SOLUTION INTRAVENOUS CONTINUOUS
Status: DISCONTINUED | OUTPATIENT
Start: 2020-12-09 | End: 2020-12-13 | Stop reason: HOSPADM

## 2020-12-09 RX ORDER — PROPOFOL 10 MG/ML
INJECTION, EMULSION INTRAVENOUS AS NEEDED
Status: DISCONTINUED | OUTPATIENT
Start: 2020-12-09 | End: 2020-12-09

## 2020-12-09 RX ORDER — LIDOCAINE HYDROCHLORIDE 20 MG/ML
INJECTION, SOLUTION EPIDURAL; INFILTRATION; INTRACAUDAL; PERINEURAL AS NEEDED
Status: DISCONTINUED | OUTPATIENT
Start: 2020-12-09 | End: 2020-12-09

## 2020-12-09 RX ADMIN — SODIUM CHLORIDE 125 ML/HR: 0.9 INJECTION, SOLUTION INTRAVENOUS at 10:08

## 2020-12-09 RX ADMIN — PROPOFOL 200 MG: 10 INJECTION, EMULSION INTRAVENOUS at 10:33

## 2020-12-09 RX ADMIN — LIDOCAINE HYDROCHLORIDE 5 ML: 20 INJECTION, SOLUTION EPIDURAL; INFILTRATION; INTRACAUDAL; PERINEURAL at 10:32

## 2020-12-18 ENCOUNTER — TELEMEDICINE (OUTPATIENT)
Dept: BARIATRICS | Facility: CLINIC | Age: 40
End: 2020-12-18
Payer: COMMERCIAL

## 2020-12-18 VITALS — WEIGHT: 176 LBS | HEIGHT: 64 IN | BODY MASS INDEX: 30.05 KG/M2

## 2020-12-18 DIAGNOSIS — R10.11 RIGHT UPPER QUADRANT PAIN: Primary | ICD-10-CM

## 2020-12-18 PROCEDURE — 3008F BODY MASS INDEX DOCD: CPT | Performed by: SURGERY

## 2020-12-18 PROCEDURE — 1036F TOBACCO NON-USER: CPT | Performed by: SURGERY

## 2020-12-18 PROCEDURE — 99214 OFFICE O/P EST MOD 30 MIN: CPT | Performed by: SURGERY

## 2021-01-11 ENCOUNTER — TELEPHONE (OUTPATIENT)
Dept: BARIATRICS | Facility: CLINIC | Age: 41
End: 2021-01-11

## 2021-01-11 NOTE — TELEPHONE ENCOUNTER
Pt had a telemedicine call with Dr Steve Lindsay in December and is still having the same issues and stated is progressively worse wanted to know what the next step will be and she also state is due for 2nd covid shot 1/22

## 2021-01-13 ENCOUNTER — OFFICE VISIT (OUTPATIENT)
Dept: BARIATRICS | Facility: CLINIC | Age: 41
End: 2021-01-13
Payer: COMMERCIAL

## 2021-01-13 VITALS
DIASTOLIC BLOOD PRESSURE: 82 MMHG | HEIGHT: 64 IN | TEMPERATURE: 97.8 F | WEIGHT: 178.5 LBS | SYSTOLIC BLOOD PRESSURE: 128 MMHG | HEART RATE: 98 BPM | RESPIRATION RATE: 20 BRPM | BODY MASS INDEX: 30.48 KG/M2

## 2021-01-13 DIAGNOSIS — R10.11 RIGHT UPPER QUADRANT ABDOMINAL PAIN: Primary | ICD-10-CM

## 2021-01-13 PROCEDURE — 99213 OFFICE O/P EST LOW 20 MIN: CPT | Performed by: SURGERY

## 2021-01-13 NOTE — H&P (VIEW-ONLY)
OFFICE VISIT - BARIATRIC SURGERY  Charlotte Cordova 45 y o  female MRN: [id number]  Unit/Bed#:  Encounter: [id number]      HPI:  Charlotte Cordova is a 45 y o  female status post lap RYGB in 2019, presents with few months history of right upper quadrant pain  The pain is located in the right upper quadrant and radiates to her back and shoulder  It is associated with nausea  In the last few weeks, the pain became more severe and more frequent  The attacks last an hour or 2  Right upper quadrant ultrasound shows evidence of cholelithiasis with no evidence of acute cholecystitis        Review of Systems    Historical Information   Past Medical History:   Diagnosis Date    Anxiety     Depression     Morbid obesity ([facility] [address] )      Past Surgical History:   Procedure Laterality Date    OVARIAN CYST REMOVAL      [place] LAP GASTRIC BYPASS/YUNIOR-EN-Y N/A 2019    Procedure: LAPAROSCOPIC YUNIOR-EN-Y GASTRIC BYPASS AND INTRAOPERATIVE EGD;  Surgeon: Sofía Waldron MD;  Location: [facility] OR;  Service: Bariatrics    WISDOM TOOTH EXTRACTION       Social History   Social History     Substance and Sexual Activity   Alcohol Use Not Currently    Frequency: Monthly or less    Comment: rarely drink     Social History     Substance and Sexual Activity   Drug Use Not Currently    Types: Marijuana    Comment: very occasionally, last used 19      Social History     Tobacco Use   Smoking Status Former Smoker    Packs/day: 0 50    Years: 10 00    Pack years: 5 00    Types: Cigarettes    Quit date: 2013    Years since quittin 5   Smokeless Tobacco Never Used       Objective       Current Vitals:   Blood Pressure: 128/82 (21 1409)  Pulse: 98 (21 1409)  Temperature: 97 8 °F (36 6 °C) (21 1409)  Respirations: 20 (21 1409)  Height: 5' 4" (162 6 cm) (21 1409)  Weight - Scale: 81 kg (178 lb 8 oz) (21 1409)    Invasive Devices     None                 Physical Exam  Constitutional: Appearance: Normal appearance  HENT:      Head: Normocephalic and atraumatic  Neck:      Musculoskeletal: Normal range of motion  Cardiovascular:      Rate and Rhythm: Normal rate and regular rhythm  Heart sounds: Normal heart sounds  Pulmonary:      Effort: Pulmonary effort is normal       Breath sounds: Normal breath sounds  Abdominal:      General: Bowel sounds are normal  There is no distension  Palpations: Abdomen is soft  Tenderness: There is abdominal tenderness (RUQ tenderness, negative Sethi's sign)  There is no guarding or rebound  Skin:     General: Skin is warm  Neurological:      Mental Status: She is alert and oriented to person, place, and time  Pathology, and Other Studies: I have personally reviewed pertinent reports  Assessment/PLAN:    Starlin Duane is a 45 y o  female status post lap RYGB in 2019, presents with few months history of right upper quadrant pain  The pain is located in the right upper quadrant and radiates to her back and shoulder  It is associated with nausea  In the last few weeks, the pain became more severe and more frequent  The attacks last an hour or 2  Right upper quadrant ultrasound shows evidence of cholelithiasis with no evidence of acute cholecystitis  The patient likely has biliary colics  We recommended laparoscopic cholecystectomy  We explained to her the risks and benefits  The patient is agreeable to proceed  We will schedule her for laparoscopic cholecystectomy  We ordered some lab work to be done as well              Adam Perez MD  Bariatric Surgery Fellow  1/13/2021  2:17 PM

## 2021-01-13 NOTE — PROGRESS NOTES
OFFICE VISIT - BARIATRIC SURGERY  Bharati Schaefer 45 y o  female MRN: [id number]  Unit/Bed#:  Encounter: [id number]      HPI:  Bharati Schaefer is a 45 y o  female status post lap RYGB in 2019, presents with few months history of right upper quadrant pain  The pain is located in the right upper quadrant and radiates to her back and shoulder  It is associated with nausea  In the last few weeks, the pain became more severe and more frequent  The attacks last an hour or 2  Right upper quadrant ultrasound shows evidence of cholelithiasis with no evidence of acute cholecystitis        Review of Systems    Historical Information   Past Medical History:   Diagnosis Date    Anxiety     Depression     Morbid obesity ([address] )      Past Surgical History:   Procedure Laterality Date    OVARIAN CYST REMOVAL      [place] LAP GASTRIC BYPASS/YUNIOR-EN-Y N/A 2019    Procedure: LAPAROSCOPIC YUNIOR-EN-Y GASTRIC BYPASS AND INTRAOPERATIVE EGD;  Surgeon: Shay Mota MD;  Location: [place] [facility] OR;  Service: Bariatrics    WISDOM TOOTH EXTRACTION       Social History   Social History     Substance and Sexual Activity   Alcohol Use Not Currently    Frequency: Monthly or less    Comment: rarely drink     Social History     Substance and Sexual Activity   Drug Use Not Currently    Types: Marijuana    Comment: very occasionally, last used 19      Social History     Tobacco Use   Smoking Status Former Smoker    Packs/day: 0 50    Years: 10 00    Pack years: 5 00    Types: Cigarettes    Quit date: 2013    Years since quittin 5   Smokeless Tobacco Never Used       Objective       Current Vitals:   Blood Pressure: 128/82 (21 1409)  Pulse: 98 (21 1409)  Temperature: 97 8 °F (36 6 °C) (21 1409)  Respirations: 20 (21 1409)  Height: 5' 4" (162 6 cm) (21 1409)  Weight - Scale: 81 kg (178 lb 8 oz) (21 1409)    Invasive Devices     None                 Physical Exam  Constitutional: Appearance: Normal appearance  HENT:      Head: Normocephalic and atraumatic  Neck:      Musculoskeletal: Normal range of motion  Cardiovascular:      Rate and Rhythm: Normal rate and regular rhythm  Heart sounds: Normal heart sounds  Pulmonary:      Effort: Pulmonary effort is normal       Breath sounds: Normal breath sounds  Abdominal:      General: Bowel sounds are normal  There is no distension  Palpations: Abdomen is soft  Tenderness: There is abdominal tenderness (RUQ tenderness, negative Sethi's sign)  There is no guarding or rebound  Skin:     General: Skin is warm  Neurological:      Mental Status: She is alert and oriented to person, place, and time  Pathology, and Other Studies: I have personally reviewed pertinent reports  Assessment/PLAN:    Tiffanie Ann is a 45 y o  female status post lap RYGB in 2019, presents with few months history of right upper quadrant pain  The pain is located in the right upper quadrant and radiates to her back and shoulder  It is associated with nausea  In the last few weeks, the pain became more severe and more frequent  The attacks last an hour or 2  Right upper quadrant ultrasound shows evidence of cholelithiasis with no evidence of acute cholecystitis  The patient likely has biliary colics  We recommended laparoscopic cholecystectomy  We explained to her the risks and benefits  The patient is agreeable to proceed  We will schedule her for laparoscopic cholecystectomy  We ordered some lab work to be done as well              Melissa Jaimes MD  Bariatric Surgery Fellow  1/13/2021  2:17 PM

## 2021-01-14 ENCOUNTER — TELEPHONE (OUTPATIENT)
Dept: FAMILY MEDICINE CLINIC | Facility: CLINIC | Age: 41
End: 2021-01-14

## 2021-01-14 NOTE — TELEPHONE ENCOUNTER
Valid  Referral #: [id number]  Effective: 01/13/2021  Expires: 04/12/2021    Referral [id number] has been successfully submitted  [address]   [address]   [address] [place], [place] [id number]   PATIENT'S INSURANCE  Member ID: [id number]  PRIMARY CARE PHYSICIAN  [employer] [facility]   NPI: [id number]   From  [address] [facility] [facility]   NPI: [id number]  [address] [facility]   [address]   [facility], [address]   To  [place] [address] WEIGHT MANAGEMENT  NPI: [id number]  [address] [facility], [address] [place], [place] [address], [facility]   ,   Service Type: Medical Care (Consult and Treat)   Place of Service: Office   Note to Patient   This referral is valid at any location for the above group       Clinical Information    Diagnoses (2)     Diagnosis    1  R10 9 - Unspecified abdominal pain    2  Z98 84 - Bariatric surgery status      Procedures (1)     Procedure    1  [address]

## 2021-01-15 DIAGNOSIS — E66.9 OBESITY, CLASS I, BMI 30-34.9: Primary | ICD-10-CM

## 2021-01-15 RX ORDER — OXYCODONE HYDROCHLORIDE 5 MG/1
5 TABLET ORAL EVERY 4 HOURS PRN
Qty: 10 TABLET | Refills: 0 | Status: SHIPPED | OUTPATIENT
Start: 2021-01-25 | End: 2021-02-04 | Stop reason: ALTCHOICE

## 2021-01-15 RX ORDER — NALOXONE HYDROCHLORIDE 4 MG/.1ML
SPRAY NASAL
Qty: 1 EACH | Refills: 1 | Status: SHIPPED | OUTPATIENT
Start: 2021-01-15 | End: 2021-01-20

## 2021-01-16 LAB
IRON SATN MFR SERPL: 49 % (ref 15–55)
IRON SERPL-MCNC: 145 UG/DL (ref 27–159)
TIBC SERPL-MCNC: 294 UG/DL (ref 250–450)
UIBC SERPL-MCNC: 149 UG/DL (ref 131–425)

## 2021-01-18 DIAGNOSIS — Z11.59 SPECIAL SCREENING EXAMINATION FOR VIRAL DISEASE: ICD-10-CM

## 2021-01-18 PROCEDURE — U0003 INFECTIOUS AGENT DETECTION BY NUCLEIC ACID (DNA OR RNA); SEVERE ACUTE RESPIRATORY SYNDROME CORONAVIRUS 2 (SARS-COV-2) (CORONAVIRUS DISEASE [COVID-19]), AMPLIFIED PROBE TECHNIQUE, MAKING USE OF HIGH THROUGHPUT TECHNOLOGIES AS DESCRIBED BY CMS-2020-01-R: HCPCS

## 2021-01-18 PROCEDURE — U0005 INFEC AGEN DETEC AMPLI PROBE: HCPCS

## 2021-01-18 NOTE — PRE-PROCEDURE INSTRUCTIONS
Pre-Surgery Instructions:   Medication Instructions    acetaminophen (TYLENOL) 325 mg tablet Instructed patient per Anesthesia Guidelines   buPROPion (WELLBUTRIN) 75 mg tablet Instructed patient per Anesthesia Guidelines   calcium citrate-vitamin d (CELEBRATE CALCIUM CITRATE) 500-500 MG-UNIT CHEW chewable tablet Instructed patient per Anesthesia Guidelines   escitalopram (LEXAPRO) 10 mg tablet Instructed patient per Anesthesia Guidelines   Iron, Ferrous Sulfate, 142 (45 Fe) MG TBCR Instructed patient per Anesthesia Guidelines   LORazepam (ATIVAN) 1 mg tablet Instructed patient per Anesthesia Guidelines   Multiple Vitamins-Minerals (BARIATRIC FUSION) CHEW Instructed patient per Anesthesia Guidelines  Instructed to take Escitalopram and Bupropion and may take Lorazepam prn with sip of water the morning of surgery  No aspirin, NSAIDs, vitamins, or supplements 1 week before surgery

## 2021-01-19 LAB — SARS-COV-2 RNA RESP QL NAA+PROBE: NEGATIVE

## 2021-01-20 ENCOUNTER — OFFICE VISIT (OUTPATIENT)
Dept: FAMILY MEDICINE CLINIC | Facility: CLINIC | Age: 41
End: 2021-01-20
Payer: COMMERCIAL

## 2021-01-20 ENCOUNTER — ANTICOAG VISIT (OUTPATIENT)
Dept: FAMILY MEDICINE CLINIC | Facility: CLINIC | Age: 41
End: 2021-01-20
Payer: COMMERCIAL

## 2021-01-20 VITALS
OXYGEN SATURATION: 97 % | WEIGHT: 180 LBS | HEIGHT: 64 IN | HEART RATE: 88 BPM | BODY MASS INDEX: 30.73 KG/M2 | TEMPERATURE: 97.1 F

## 2021-01-20 DIAGNOSIS — Z01.818 PRE-OP EXAMINATION: Primary | ICD-10-CM

## 2021-01-20 DIAGNOSIS — K81.9 CHOLECYSTITIS: ICD-10-CM

## 2021-01-20 PROCEDURE — 3008F BODY MASS INDEX DOCD: CPT | Performed by: FAMILY MEDICINE

## 2021-01-20 PROCEDURE — 99214 OFFICE O/P EST MOD 30 MIN: CPT | Performed by: FAMILY MEDICINE

## 2021-01-20 PROCEDURE — 3725F SCREEN DEPRESSION PERFORMED: CPT | Performed by: FAMILY MEDICINE

## 2021-01-20 PROCEDURE — 1036F TOBACCO NON-USER: CPT | Performed by: FAMILY MEDICINE

## 2021-01-20 NOTE — PROGRESS NOTES
[facility] Medical        NAME: Masood Ro is a 45 y o  female  : 1980    MRN: [id number]  DATE: 2021  TIME: 4:12 PM    Assessment and Plan   Pre-op examination [Z01 818]  1  Pre-op examination     2  Cholecystitis           Patient Instructions     Patient Instructions   EKG NL--12 lead w/ lots artifact--rhythm strip NAD--clear for surg---labs not avail--sent to surgeon-pt cleared for surg pending rev of labs by surg            Chief Complaint     Chief Complaint   Patient presents with    Pre-op Exam         History of Present Illness       Pre-op for gall surg      Review of Systems   Review of Systems   Constitutional: Negative for fatigue, fever and unexpected weight change  HENT: Negative for congestion, sinus pain and sore throat  Eyes: Negative for visual disturbance  Respiratory: Negative for shortness of breath and wheezing  Cardiovascular: Negative for chest pain and palpitations  Gastrointestinal: Negative for abdominal pain, nausea and vomiting  Musculoskeletal: Negative  Negative for arthralgias and myalgias  Neurological: Negative for syncope, weakness and numbness  Psychiatric/Behavioral: Negative  Negative for confusion, dysphoric mood and suicidal ideas           Current Medications       Current Outpatient Medications:     acetaminophen (TYLENOL) 325 mg tablet, Take 3 tablets (975 mg total) by mouth every 8 (eight) hours, Disp: 30 tablet, Rfl: 0    buPROPion (WELLBUTRIN) 75 mg tablet, TAKE ONE TABLET BY MOUTH TWICE A DAY, Disp: 60 tablet, Rfl: 5    calcium citrate-vitamin d (CELEBRATE CALCIUM CITRATE) 500-500 MG-UNIT CHEW chewable tablet, , Disp: , Rfl:     escitalopram (LEXAPRO) 10 mg tablet, Take 1 tablet (10 mg total) by mouth daily, Disp: 90 tablet, Rfl: 3    Iron, Ferrous Sulfate, 142 (45 Fe) MG TBCR, Take by mouth, Disp: , Rfl:     LORazepam (ATIVAN) 1 mg tablet, Take 1 tablet (1 mg total) by mouth every 8 (eight) hours as needed for anxiety, Disp: 30 tablet, Rfl: 1    Multiple Vitamins-Minerals (BARIATRIC FUSION) CHEW, , Disp: , Rfl:     naloxone (NARCAN) 4 mg/0 1 mL nasal spray, Administer 1 spray into a nostril  If no response after 2-3 minutes, give another dose in the other nostril using a new spray , Disp: 1 each, Rfl: 1    omeprazole (PriLOSEC) 20 mg delayed release capsule, Take 1 capsule (20 mg total) by mouth daily (Patient not taking: Reported on 1/13/2021), Disp: 90 capsule, Rfl: 0    [START ON 1/25/2021] oxyCODONE (ROXICODONE) 5 mg immediate release tablet, Take 1 tablet (5 mg total) by mouth every 4 (four) hours as needed for moderate painMax Daily Amount: 30 mg, Disp: 10 tablet, Rfl: 0    Current Allergies     Allergies as of 01/20/2021 - Reviewed 01/20/2021   Allergen Reaction Noted    Sulfa antibiotics Hives 03/26/2019            The following portions of the patient's history were reviewed and updated as appropriate: allergies, current medications, past family history, past medical history, past social history, past surgical history and problem list      Past Medical History:   Diagnosis Date    Anxiety     Depression     Morbid obesity ([address] )     Wears contact lenses        Past Surgical History:   Procedure Laterality Date    OVARIAN CYST REMOVAL      [place] LAP GASTRIC BYPASS/YUNIOR-EN-Y N/A 9/23/2019    Procedure: LAPAROSCOPIC YUNIOR-EN-Y GASTRIC BYPASS AND INTRAOPERATIVE EGD;  Surgeon: Shivam Higginbotham MD;  Location: [facility];  Service: Bariatrics    WISDOM TOOTH EXTRACTION         Family History   Problem Relation Age of Onset    Hypertension Mother     Obesity Mother     Obesity Father     Depression Father    Arthor Deed Sister     No Known Problems Brother     Drug abuse Cousin     Stroke Maternal Aunt          Medications have been verified          Objective   Pulse 88   Temp (!) 97 1 °F (36 2 °C)   Ht 5' 4" (1 626 m)   Wt 81 6 kg (180 lb)   LMP 01/13/2021   SpO2 97%   BMI 30 90 kg/m² Physical Exam     Physical Exam  Constitutional:       Appearance: She is well-developed  HENT:      Right Ear: Ear canal normal  Tympanic membrane is not injected  Left Ear: Ear canal normal  Tympanic membrane is not injected  Nose: Nose normal    Eyes:      General:         Right eye: No discharge  Left eye: No discharge  Conjunctiva/sclera: Conjunctivae normal       Pupils: Pupils are equal, round, and reactive to light  Neck:      Musculoskeletal: Normal range of motion and neck supple  Thyroid: No thyromegaly  Cardiovascular:      Rate and Rhythm: Normal rate and regular rhythm  Heart sounds: Normal heart sounds  No murmur  Pulmonary:      Effort: Pulmonary effort is normal  No respiratory distress  Breath sounds: Normal breath sounds  No wheezing  Abdominal:      General: Bowel sounds are normal  There is no distension  Palpations: Abdomen is soft  Tenderness: There is no abdominal tenderness  Musculoskeletal: Normal range of motion  Lymphadenopathy:      Cervical: No cervical adenopathy  Skin:     General: Skin is warm and dry  Neurological:      Mental Status: She is alert and oriented to person, place, and time  She is not disoriented  Sensory: No sensory deficit  Gait: Gait normal       Deep Tendon Reflexes: Reflexes are normal and symmetric  Psychiatric:         Speech: Speech normal          Behavior: Behavior normal          Thought Content:  Thought content normal          Judgment: Judgment normal

## 2021-01-20 NOTE — PATIENT INSTRUCTIONS
EKG NL--12 lead w/ lots artifact--rhythm strip NAD--clear for surg---labs not avail--sent to surgeon-pt cleared for surg pending rev of labs by surg

## 2021-01-21 ENCOUNTER — TELEPHONE (OUTPATIENT)
Dept: FAMILY MEDICINE CLINIC | Facility: CLINIC | Age: 41
End: 2021-01-21

## 2021-01-21 NOTE — TELEPHONE ENCOUNTER
[facility] Pre-encounter referral    [facility]:[id number]  Dx:R10 11 [id number]  [id number]       Valid  Referral #: [id number]  Effective: 01/21/2021  Expires: 04/20/2021    Referral [id number] has been successfully submitted  [address]   [address]   [address] [place], [place] [id number]   PATIENT'S INSURANCE  Member ID: [id number]  PRIMARY CARE PHYSICIAN  [employer] [address] [employer]   NPI: [id number]   From  [address] [facility] [facility]   NPI: [id number]  [address]   [address]   [place], [address]   To  [facility]  NPI: [id number]  [address], [facility]  [place] [facility], [facility]   ,   Service Type: Medical Care (Consult and Treat)   Place of Service: Office   Note to Patient   This referral is valid at any location for the above group       Clinical Information    Diagnoses (3)     Diagnosis    1  R10 11 - Right upper quadrant pain    2  K80 20 - Calculus of gallbladder without cholecystitis without obstruction    3  Z98 84 - Bariatric surgery status      Procedures (3)     Procedure    1  02924    2  80603    3  73088

## 2021-01-22 ENCOUNTER — ANESTHESIA EVENT (OUTPATIENT)
Dept: PERIOP | Facility: HOSPITAL | Age: 41
End: 2021-01-22
Payer: COMMERCIAL

## 2021-01-22 LAB
25(OH)D3+25(OH)D2 SERPL-MCNC: 33.9 NG/ML (ref 30–100)
ALBUMIN SERPL-MCNC: 4.4 G/DL (ref 3.8–4.8)
ALBUMIN/GLOB SERPL: 1.7 {RATIO} (ref 1.2–2.2)
ALP SERPL-CCNC: 54 IU/L (ref 39–117)
ALT SERPL-CCNC: 16 IU/L (ref 0–32)
AST SERPL-CCNC: 20 IU/L (ref 0–40)
BILIRUB SERPL-MCNC: 0.4 MG/DL (ref 0–1.2)
BUN SERPL-MCNC: 11 MG/DL (ref 6–20)
BUN/CREAT SERPL: 15 (ref 9–23)
CALCIUM SERPL-MCNC: 9.6 MG/DL (ref 8.7–10.2)
CHLORIDE SERPL-SCNC: 102 MMOL/L (ref 96–106)
CO2 SERPL-SCNC: 25 MMOL/L (ref 20–29)
CREAT SERPL-MCNC: 0.72 MG/DL (ref 0.57–1)
ERYTHROCYTE [DISTWIDTH] IN BLOOD BY AUTOMATED COUNT: 11.5 % (ref 11.7–15.4)
FERRITIN SERPL-MCNC: 157 NG/ML (ref 15–150)
FOLATE SERPL-MCNC: >20 NG/ML
GLOBULIN SER-MCNC: 2.6 G/DL (ref 1.5–4.5)
GLUCOSE SERPL-MCNC: 82 MG/DL (ref 65–99)
HCT VFR BLD AUTO: 40.8 % (ref 34–46.6)
HGB BLD-MCNC: 13.6 G/DL (ref 11.1–15.9)
MCH RBC QN AUTO: 31.1 PG (ref 26.6–33)
MCHC RBC AUTO-ENTMCNC: 33.3 G/DL (ref 31.5–35.7)
MCV RBC AUTO: 93 FL (ref 79–97)
PLATELET # BLD AUTO: 283 X10E3/UL (ref 150–450)
POTASSIUM SERPL-SCNC: 4 MMOL/L (ref 3.5–5.2)
PROT SERPL-MCNC: 7 G/DL (ref 6–8.5)
PTH-INTACT SERPL-MCNC: 21 PG/ML (ref 15–65)
RBC # BLD AUTO: 4.37 X10E6/UL (ref 3.77–5.28)
SL AMB EGFR AFRICAN AMERICAN: 122 ML/MIN/1.73
SL AMB EGFR NON AFRICAN AMERICAN: 106 ML/MIN/1.73
SODIUM SERPL-SCNC: 139 MMOL/L (ref 134–144)
VIT A SERPL-MCNC: 59.1 UG/DL (ref 18.9–57.3)
VIT B1 BLD-SCNC: 152 NMOL/L (ref 66.5–200)
VIT B12 SERPL-MCNC: 345 PG/ML (ref 232–1245)
WBC # BLD AUTO: 5.7 X10E3/UL (ref 3.4–10.8)
ZINC SERPL-MCNC: 69 UG/DL (ref 44–115)

## 2021-01-24 NOTE — ANESTHESIA PREPROCEDURE EVALUATION
Procedure:  LAPAROSCOPIC CHOLECYSTECTOMY (N/A Abdomen)    Relevant Problems   GI/HEPATIC   (+) Bariatric surgery status      NEURO/PSYCH   (+) Depression        Physical Exam    Airway    Mallampati score: I  TM Distance: <3 FB  Neck ROM: full     Dental       Cardiovascular  Rhythm: regular, Rate: normal,     Pulmonary  Pulmonary exam normal     Other Findings        Anesthesia Plan  ASA Score- 2     Anesthesia Type- general with ASA Monitors  Additional Monitors:   Airway Plan: ETT  Plan Factors-    Chart reviewed  Existing labs reviewed  Patient summary reviewed  Patient is not a current smoker  Induction- intravenous  Postoperative Plan-     Informed Consent- Anesthetic plan and risks discussed with patient

## 2021-01-25 ENCOUNTER — ANESTHESIA (OUTPATIENT)
Dept: PERIOP | Facility: HOSPITAL | Age: 41
End: 2021-01-25
Payer: COMMERCIAL

## 2021-01-25 ENCOUNTER — HOSPITAL ENCOUNTER (OUTPATIENT)
Facility: HOSPITAL | Age: 41
Setting detail: OUTPATIENT SURGERY
Discharge: HOME | End: 2021-01-25
Attending: SURGERY | Admitting: SURGERY
Payer: COMMERCIAL

## 2021-01-25 VITALS
TEMPERATURE: 98.2 F | BODY MASS INDEX: 30.39 KG/M2 | WEIGHT: 178 LBS | OXYGEN SATURATION: 100 % | SYSTOLIC BLOOD PRESSURE: 135 MMHG | HEART RATE: 78 BPM | HEIGHT: 64 IN | RESPIRATION RATE: 18 BRPM | DIASTOLIC BLOOD PRESSURE: 72 MMHG

## 2021-01-25 VITALS — HEART RATE: 113 BPM

## 2021-01-25 DIAGNOSIS — R10.11 RIGHT UPPER QUADRANT PAIN: ICD-10-CM

## 2021-01-25 DIAGNOSIS — K80.20 CHOLELITHIASIS: ICD-10-CM

## 2021-01-25 DIAGNOSIS — Z98.84 STATUS POST BARIATRIC SURGERY: ICD-10-CM

## 2021-01-25 LAB
EXT PREGNANCY TEST URINE: NEGATIVE
EXT. CONTROL: NORMAL

## 2021-01-25 PROCEDURE — 47562 LAPAROSCOPIC CHOLECYSTECTOMY: CPT | Performed by: STUDENT IN AN ORGANIZED HEALTH CARE EDUCATION/TRAINING PROGRAM

## 2021-01-25 PROCEDURE — 88304 TISSUE EXAM BY PATHOLOGIST: CPT | Performed by: PATHOLOGY

## 2021-01-25 PROCEDURE — 47562 LAPAROSCOPIC CHOLECYSTECTOMY: CPT | Performed by: SURGERY

## 2021-01-25 PROCEDURE — C9290 INJ, BUPIVACAINE LIPOSOME: HCPCS | Performed by: SURGERY

## 2021-01-25 PROCEDURE — 81025 URINE PREGNANCY TEST: CPT | Performed by: ANESTHESIOLOGY

## 2021-01-25 RX ORDER — KETOROLAC TROMETHAMINE 30 MG/ML
INJECTION, SOLUTION INTRAMUSCULAR; INTRAVENOUS AS NEEDED
Status: DISCONTINUED | OUTPATIENT
Start: 2021-01-25 | End: 2021-01-25

## 2021-01-25 RX ORDER — SODIUM CHLORIDE 9 MG/ML
125 INJECTION, SOLUTION INTRAVENOUS CONTINUOUS
Status: DISCONTINUED | OUTPATIENT
Start: 2021-01-25 | End: 2021-01-25 | Stop reason: HOSPADM

## 2021-01-25 RX ORDER — SODIUM CHLORIDE 9 MG/ML
INJECTION, SOLUTION INTRAVENOUS AS NEEDED
Status: DISCONTINUED | OUTPATIENT
Start: 2021-01-25 | End: 2021-01-25 | Stop reason: HOSPADM

## 2021-01-25 RX ORDER — PROPOFOL 10 MG/ML
INJECTION, EMULSION INTRAVENOUS AS NEEDED
Status: DISCONTINUED | OUTPATIENT
Start: 2021-01-25 | End: 2021-01-25

## 2021-01-25 RX ORDER — DEXAMETHASONE SODIUM PHOSPHATE 10 MG/ML
INJECTION, SOLUTION INTRAMUSCULAR; INTRAVENOUS AS NEEDED
Status: DISCONTINUED | OUTPATIENT
Start: 2021-01-25 | End: 2021-01-25

## 2021-01-25 RX ORDER — CEFAZOLIN SODIUM 2 G/50ML
2000 SOLUTION INTRAVENOUS EVERY 8 HOURS
Status: DISCONTINUED | OUTPATIENT
Start: 2021-01-25 | End: 2021-01-25 | Stop reason: HOSPADM

## 2021-01-25 RX ORDER — SCOLOPAMINE TRANSDERMAL SYSTEM 1 MG/1
1 PATCH, EXTENDED RELEASE TRANSDERMAL ONCE AS NEEDED
Status: DISCONTINUED | OUTPATIENT
Start: 2021-01-25 | End: 2021-01-25 | Stop reason: HOSPADM

## 2021-01-25 RX ORDER — ONDANSETRON 2 MG/ML
INJECTION INTRAMUSCULAR; INTRAVENOUS AS NEEDED
Status: DISCONTINUED | OUTPATIENT
Start: 2021-01-25 | End: 2021-01-25

## 2021-01-25 RX ORDER — BUPIVACAINE HYDROCHLORIDE AND EPINEPHRINE 5; 5 MG/ML; UG/ML
INJECTION, SOLUTION PERINEURAL AS NEEDED
Status: DISCONTINUED | OUTPATIENT
Start: 2021-01-25 | End: 2021-01-25 | Stop reason: HOSPADM

## 2021-01-25 RX ORDER — MIDAZOLAM HYDROCHLORIDE 2 MG/2ML
INJECTION, SOLUTION INTRAMUSCULAR; INTRAVENOUS AS NEEDED
Status: DISCONTINUED | OUTPATIENT
Start: 2021-01-25 | End: 2021-01-25

## 2021-01-25 RX ORDER — LIDOCAINE HYDROCHLORIDE 10 MG/ML
INJECTION, SOLUTION EPIDURAL; INFILTRATION; INTRACAUDAL; PERINEURAL AS NEEDED
Status: DISCONTINUED | OUTPATIENT
Start: 2021-01-25 | End: 2021-01-25

## 2021-01-25 RX ORDER — ROCURONIUM BROMIDE 10 MG/ML
INJECTION, SOLUTION INTRAVENOUS AS NEEDED
Status: DISCONTINUED | OUTPATIENT
Start: 2021-01-25 | End: 2021-01-25

## 2021-01-25 RX ORDER — MORPHINE SULFATE 4 MG/ML
4 INJECTION, SOLUTION INTRAMUSCULAR; INTRAVENOUS EVERY 4 HOURS PRN
Status: CANCELLED | OUTPATIENT
Start: 2021-01-25

## 2021-01-25 RX ORDER — FENTANYL CITRATE/PF 50 MCG/ML
50 SYRINGE (ML) INJECTION
Status: DISCONTINUED | OUTPATIENT
Start: 2021-01-25 | End: 2021-01-25 | Stop reason: HOSPADM

## 2021-01-25 RX ORDER — HYDROMORPHONE HCL/PF 1 MG/ML
0.5 SYRINGE (ML) INJECTION
Status: DISCONTINUED | OUTPATIENT
Start: 2021-01-25 | End: 2021-01-25 | Stop reason: HOSPADM

## 2021-01-25 RX ORDER — ONDANSETRON 2 MG/ML
4 INJECTION INTRAMUSCULAR; INTRAVENOUS ONCE AS NEEDED
Status: DISCONTINUED | OUTPATIENT
Start: 2021-01-25 | End: 2021-01-25 | Stop reason: HOSPADM

## 2021-01-25 RX ORDER — FENTANYL CITRATE 50 UG/ML
INJECTION, SOLUTION INTRAMUSCULAR; INTRAVENOUS AS NEEDED
Status: DISCONTINUED | OUTPATIENT
Start: 2021-01-25 | End: 2021-01-25

## 2021-01-25 RX ORDER — MAGNESIUM HYDROXIDE 1200 MG/15ML
LIQUID ORAL AS NEEDED
Status: DISCONTINUED | OUTPATIENT
Start: 2021-01-25 | End: 2021-01-25 | Stop reason: HOSPADM

## 2021-01-25 RX ORDER — OXYCODONE HCL 5 MG/5 ML
5 SOLUTION, ORAL ORAL EVERY 4 HOURS PRN
Status: DISCONTINUED | OUTPATIENT
Start: 2021-01-25 | End: 2021-01-25 | Stop reason: HOSPADM

## 2021-01-25 RX ADMIN — OXYCODONE HYDROCHLORIDE 5 MG: 5 SOLUTION ORAL at 10:15

## 2021-01-25 RX ADMIN — FENTANYL CITRATE 50 MCG: 50 INJECTION, SOLUTION INTRAMUSCULAR; INTRAVENOUS at 08:03

## 2021-01-25 RX ADMIN — SODIUM CHLORIDE: 0.9 INJECTION, SOLUTION INTRAVENOUS at 08:00

## 2021-01-25 RX ADMIN — PROPOFOL 200 MG: 10 INJECTION, EMULSION INTRAVENOUS at 07:36

## 2021-01-25 RX ADMIN — MIDAZOLAM 2 MG: 1 INJECTION INTRAMUSCULAR; INTRAVENOUS at 07:32

## 2021-01-25 RX ADMIN — ROCURONIUM BROMIDE 30 MG: 10 INJECTION, SOLUTION INTRAVENOUS at 07:36

## 2021-01-25 RX ADMIN — SCOPALAMINE 1 PATCH: 1 PATCH, EXTENDED RELEASE TRANSDERMAL at 05:59

## 2021-01-25 RX ADMIN — FENTANYL CITRATE 50 MCG: 50 INJECTION, SOLUTION INTRAMUSCULAR; INTRAVENOUS at 08:09

## 2021-01-25 RX ADMIN — FENTANYL CITRATE 50 MCG: 50 INJECTION, SOLUTION INTRAMUSCULAR; INTRAVENOUS at 07:55

## 2021-01-25 RX ADMIN — SODIUM CHLORIDE 125 ML/HR: 0.9 INJECTION, SOLUTION INTRAVENOUS at 06:02

## 2021-01-25 RX ADMIN — ONDANSETRON 4 MG: 2 INJECTION INTRAMUSCULAR; INTRAVENOUS at 07:36

## 2021-01-25 RX ADMIN — CEFAZOLIN SODIUM 2000 MG: 2 SOLUTION INTRAVENOUS at 07:25

## 2021-01-25 RX ADMIN — LIDOCAINE HYDROCHLORIDE 100 MG: 10 INJECTION, SOLUTION EPIDURAL; INFILTRATION; INTRACAUDAL; PERINEURAL at 07:36

## 2021-01-25 RX ADMIN — DEXAMETHASONE SODIUM PHOSPHATE 4 MG: 10 INJECTION, SOLUTION INTRAMUSCULAR; INTRAVENOUS at 07:36

## 2021-01-25 RX ADMIN — FENTANYL CITRATE 50 MCG: 50 INJECTION, SOLUTION INTRAMUSCULAR; INTRAVENOUS at 07:36

## 2021-01-25 RX ADMIN — KETOROLAC TROMETHAMINE 30 MG: 30 INJECTION, SOLUTION INTRAMUSCULAR at 08:30

## 2021-01-25 NOTE — INTERVAL H&P NOTE
H&P reviewed  After examining the patient I find no changes in the patients condition since the H&P had been written      Vitals:    01/25/21 0544   BP: 128/82   Pulse: 71   Resp: 16   Temp: 98 1 °F (36 7 °C)   SpO2: 99%

## 2021-01-25 NOTE — OP NOTE
OPERATIVE REPORT  PATIENT NAME: Janay Stoddard    :  1980  MRN: [id number]  Pt Location: [facility] OR ROOM 08    SURGERY DATE: 2021    Surgeon(s) and Role:     * Claudia Dimas MD - Primary     * Nichelle Moscoso MD - Fellow     * Loyce Crigler, MD - Fellow    Preop Diagnosis:  Right upper quadrant pain [R10 11]  Cholelithiasis [K80 20]  Status post bariatric surgery [Z98 84]    Post-Op Diagnosis Codes:     * Right upper quadrant pain [R10 11]     * Cholelithiasis [K80 20]     * Status post bariatric surgery [Z98 84]    Procedure(s) (LRB):  LAPAROSCOPIC CHOLECYSTECTOMY (N/A)    Specimen(s):  ID Type Source Tests Collected by Time Destination   1 :  Tissue Gallbladder TISSUE EXAM Claudia Dimas MD 2021 0801        Estimated Blood Loss:   5 ml    Drains:  * No LDAs found *    Anesthesia Type:   General    Operative Indications:  Right upper quadrant pain [R10 11]  Cholelithiasis [K80 20]  Status post bariatric surgery [Z98 84]      Operative Findings:  Cholelithiasis  Possible Uterine fibroid, follow up with GYN recommended    Complications:   None    Procedure and Technique:  Laparoscopic cholecystectomy   I was present for the entire procedure    Patient Disposition:  Stable    No qualified resident available to assist  Assistant was necessary for traction, counter traction and for camera holding    Procedure:  Patient was brought to the operating room and was placed in a supine position, patient was induced and intubated under general endotracheal anesthesia, abdominal wall was and draped under sterile conditions in the usual fashion  Patient received a dose of IV antibiotics prior to the procedure   An infraumbilical incision was made with #15 blade and the subcutaneous tissue was dissected down to the fascia, fascia was then opened with a #15 blade and a 12 mm trocar was placed under direct visualization, 2 separate 5 mm trocars were placed on the right side of the abdominal wall and another 12 mm trocar was placed in the subxiphoid window, patient was placed in a reverse Trendelenburg with the right side was elevated, gallbladder appeared to be thickened and distended so the gallbladder was decompressed with the suction  catheter, fundus was then retracted in a cephalad fashion and the infundibulum was retracted toward the right side of the abdominal wall, dissection was started at the level of the infundibulo-cystic junction in the triangle of Callot, cystic duct and cystic artery were both completely skeletonized and dissected until the critical view was obtained, cystic duct was then transected between 2 proximal hemoclips and one distal hemoclip, cystic artery was controlled with one proximal hemoclip and taken down with the Harmonic scalpel, the gallbladder was then dissected off the liver bed using the Harmonic scalpel, no injury to Teresa's capsule was made there was no violation of the liver bed and there was no evidence of bleeding or bile leak, there was some spillage from the gallbladder itself which was suction, gallbladder was placed in an Endo Catch bag and removed from the subxiphoid port, abdominal cavity was irrigated with 3 L normal saline midline port was closed with #1 Vicryl suture, skin edges were approximated with 4-0 Monocryl and Histocryl  Patient was extubated and transferred to the PACU in stable condition      SIGNATURE: Shay Mota MD  DATE: January 25, 2021  TIME: 8:28 AM yes

## 2021-01-25 NOTE — ADDENDUM NOTE
Addendum  created 01/25/21 1024 by Josh Perkins CRNA    Intraprocedure Meds edited, Orders acknowledged in Narrator

## 2021-01-25 NOTE — DISCHARGE INSTR - AVS FIRST PAGE
[facility] pain [facility] [address] [facility]   Take Tylenol as instructed  Stay hydrated and follow your discharge diet progression   Mild nausea is ok as long as you can drink fluids  Follow up with Dr Caroline Brown and your PCP within the next week

## 2021-01-25 NOTE — DISCHARGE INSTRUCTIONS
Bariatric/Weight Loss Surgery  Hospital Discharge Instructions  1  ACTIVITY:  a  Progress as feels comfortable - a good rule is:  if you are doing something and it begins to hurt, stop doing the activity  Walk every hour while at home  b  Tera Means may walk stairs if you do so slowly  c  You may shower 48 hours after surgery  d  Use your incentive spirometer 10 times per hour while awake for 1 week  e  Do NOT drive for 48 hours after surgery  No driving 24 hours after taking certain prescription pain medications   Examples of such medication are Percocet, Darvocet, Oxycodone, Tylenol #3, and Tylenol with Codeine  2  DIET  Stay on a low fat diet  3  MEDICATIONS:  a  The abdominal nerve block will wear off during the first 1-2 days that you are home, and you may become sore  Continue to take your Tylenol and your pain medication as instructed  4  INCISION CARE  a  You may shower and get incisions wet 2 days after surgery  No soaking tub baths or swimming for 30 days after surgery  Keep abdominal area and incisions clean  Use soap and water to create a good lather and rinse off  Do not scrub incisions  b  If you have a drain, empty the drain as the nurses instructed  5  FOLLOW-UP APPOINTMENT should be made for one week after discharge  Call surgeons office at [phone] to schedule an appointment      6  CALL YOUR DOCTOR FOR:  pain not controlled by pain medications, a temperature greater than 101 5° F, any increase or change in drainage or redness from any incision, any vomiting or inability to keep liquids down, shortness of breath, shoulder pain, or bleeding

## 2021-02-04 ENCOUNTER — OFFICE VISIT (OUTPATIENT)
Dept: BARIATRICS | Facility: CLINIC | Age: 41
End: 2021-02-04

## 2021-02-04 VITALS
TEMPERATURE: 98.1 F | HEART RATE: 72 BPM | WEIGHT: 179.5 LBS | HEIGHT: 65 IN | DIASTOLIC BLOOD PRESSURE: 70 MMHG | BODY MASS INDEX: 29.91 KG/M2 | SYSTOLIC BLOOD PRESSURE: 120 MMHG

## 2021-02-04 DIAGNOSIS — Z98.84 BARIATRIC SURGERY STATUS: Primary | ICD-10-CM

## 2021-02-04 PROCEDURE — 3008F BODY MASS INDEX DOCD: CPT | Performed by: FAMILY MEDICINE

## 2021-02-04 PROCEDURE — 99024 POSTOP FOLLOW-UP VISIT: CPT | Performed by: SURGERY

## 2021-02-04 NOTE — PROGRESS NOTES
POST OP UP VISIT - BARIATRIC SURGERY  Starlin Duane 45 y o  female MRN: [id number]  Unit/Bed#:  Encounter: [id number]      HPI:  Starlin Duane is a 45 y o  female status post Laparoscopic Cholecystectomy performed on 1/25/21 with previous RNYGB performed by Dr Nigel Sicard on 9/23/19 returning to office for first post op visit since surgery  Denies diarrhea and is feeling good overall  Pathology report WNL  EWL: 83%  Current weight: 179 5 lb  Current BMI: 30 3      Review of Systems   Constitutional: Negative for chills and fever  HENT: Negative for ear pain and sore throat  Eyes: Negative for pain and visual disturbance  Respiratory: Negative for cough and shortness of breath  Cardiovascular: Negative for chest pain and palpitations  Gastrointestinal: Negative for abdominal pain, constipation, diarrhea, nausea and vomiting  Genitourinary: Negative for dysuria and hematuria  Musculoskeletal: Negative for arthralgias and back pain  Skin: Negative for color change and rash  Neurological: Negative for seizures and syncope  All other systems reviewed and are negative        Historical Information   Past Medical History:   Diagnosis Date    Anxiety     Cholelithiasis     lap ines today 1/25/2021    Depression     Morbid obesity ([address] )     RUEN-Y,surgery 9/2019- wt loss 130 lbs    Ovarian cyst     surgicallly removed    PONV (postoperative nausea and vomiting)     "severely" after RUEN-Y    Seasonal allergies     Wears contact lenses      Past Surgical History:   Procedure Laterality Date    CHOLECYSTECTOMY LAPAROSCOPIC N/A 1/25/2021    Procedure: LAPAROSCOPIC CHOLECYSTECTOMY;  Surgeon: Russ Oshea MD;  [facility];  Service: Bariatrics    EGD      OVARIAN CYST REMOVAL      [place] LAP GASTRIC BYPASS/YUNIOR-EN-Y N/A 9/23/2019    Procedure: LAPAROSCOPIC YUNIOR-EN-Y GASTRIC BYPASS AND INTRAOPERATIVE EGD;  Surgeon: Russ Oshea MD;  [facility];  Service: Bariatrics    WISDOM TOOTH EXTRACTION       Social History   Social History     Substance and Sexual Activity   Alcohol Use Not Currently    Frequency: Monthly or less    Comment: rarely drink     Social History     Substance and Sexual Activity   Drug Use Not Currently    Types: Marijuana     Social History     Tobacco Use   Smoking Status Former Smoker    Packs/day: 0 50    Years: 10 00    Pack years: 5 00    Types: Cigarettes    Quit date: 2013    Years since quittin 6   Smokeless Tobacco Never Used     Family History: non-contributory    Meds/Allergies   all medications and allergies reviewed  Allergies   Allergen Reactions    Sulfa Antibiotics Hives       Objective       Current Vitals:   Blood Pressure: 120/70 (21)  Pulse: 72 (21)  Temperature: 98 1 °F (36 7 °C) (21)  Height: 5' 4 5" (163 8 cm) (21)  Weight - Scale: 81 4 kg (179 lb 8 oz) (21)      Invasive Devices     None                 Physical Exam  Constitutional:       Appearance: Normal appearance  HENT:      Head: Normocephalic and atraumatic  Nose: Nose normal       Mouth/Throat:      Mouth: Mucous membranes are moist    Eyes:      Extraocular Movements: Extraocular movements intact  Pupils: Pupils are equal, round, and reactive to light  Neck:      Musculoskeletal: Normal range of motion  Cardiovascular:      Rate and Rhythm: Normal rate and regular rhythm  Pulses: Normal pulses  Heart sounds: Normal heart sounds  Pulmonary:      Effort: Pulmonary effort is normal       Breath sounds: Normal breath sounds  Abdominal:      Palpations: Abdomen is soft  Tenderness: There is no abdominal tenderness  There is no guarding or rebound  Comments: Incisions healing well, no signs of infection   Skin:     General: Skin is warm  Neurological:      General: No focal deficit present        Mental Status: She is alert and oriented to person, place, and time    Psychiatric:         Mood and Affect: Mood normal          Behavior: Behavior normal          Assessment/PLAN:    45 y o  female status post Laparoscopic Cholecystectomy performed on 1/25/21 with previous RNYGB performed by Dr Ewa Wheeler on 9/23/19 returning to office doing well post op  No major issues and healing well  The pathology report was reviewed with the patient and the results were within normal limits  Pathology, and Other Studies: I have personally reviewed pertinent reports  Tissue exam shows chronic cholecystitis with cholelithiasis and cholesterolosis  Single reactive lymph node; negative for neoplasia  Increase physical activity slowly as tolerated and instructed  Advance diet as instructed by our dietitians today and as indicated in the binder  Follow up in November         Chacorta Sabillon PA-C  Bariatric Surgery   2/4/2021  9:20 AM

## 2021-04-25 DIAGNOSIS — F41.9 ANXIETY: ICD-10-CM

## 2021-04-25 DIAGNOSIS — F32.A DEPRESSION, UNSPECIFIED DEPRESSION TYPE: ICD-10-CM

## 2021-04-26 RX ORDER — LORAZEPAM 1 MG/1
TABLET ORAL
Qty: 30 TABLET | Refills: 1 | Status: SHIPPED | OUTPATIENT
Start: 2021-04-26 | End: 2022-04-28 | Stop reason: SDUPTHER

## 2021-04-26 RX ORDER — ESCITALOPRAM OXALATE 10 MG/1
TABLET ORAL
Qty: 90 TABLET | Refills: 3 | Status: SHIPPED | OUTPATIENT
Start: 2021-04-26 | End: 2022-04-27 | Stop reason: SDUPTHER

## 2021-05-25 DIAGNOSIS — F41.9 ANXIETY: ICD-10-CM

## 2021-05-25 RX ORDER — BUPROPION HYDROCHLORIDE 75 MG/1
TABLET ORAL
Qty: 60 TABLET | Refills: 5 | Status: SHIPPED | OUTPATIENT
Start: 2021-05-25 | End: 2022-04-27

## 2021-06-04 ENCOUNTER — OFFICE VISIT (OUTPATIENT)
Dept: FAMILY MEDICINE CLINIC | Facility: CLINIC | Age: 41
End: 2021-06-04
Payer: COMMERCIAL

## 2021-06-04 VITALS
WEIGHT: 177 LBS | SYSTOLIC BLOOD PRESSURE: 128 MMHG | DIASTOLIC BLOOD PRESSURE: 74 MMHG | BODY MASS INDEX: 29.49 KG/M2 | HEIGHT: 65 IN | HEART RATE: 96 BPM | OXYGEN SATURATION: 98 % | RESPIRATION RATE: 16 BRPM

## 2021-06-04 DIAGNOSIS — F41.9 ANXIETY: Primary | ICD-10-CM

## 2021-06-04 PROCEDURE — 99214 OFFICE O/P EST MOD 30 MIN: CPT | Performed by: FAMILY MEDICINE

## 2021-06-04 PROCEDURE — 3008F BODY MASS INDEX DOCD: CPT | Performed by: FAMILY MEDICINE

## 2021-06-04 PROCEDURE — 1036F TOBACCO NON-USER: CPT | Performed by: FAMILY MEDICINE

## 2021-06-04 RX ORDER — ETONOGESTREL AND ETHINYL ESTRADIOL 11.7; 2.7 MG/1; MG/1
1 INSERT, EXTENDED RELEASE VAGINAL
COMMUNITY

## 2021-06-04 RX ORDER — BUPROPION HYDROCHLORIDE 150 MG/1
150 TABLET, EXTENDED RELEASE ORAL 2 TIMES DAILY
Qty: 60 TABLET | Refills: 5 | Status: SHIPPED | OUTPATIENT
Start: 2021-06-04 | End: 2021-11-23

## 2021-06-04 NOTE — PROGRESS NOTES
[facility]        NAME: Arlene Ross is a 45 y o  female  : 1980    MRN: [id number]  DATE: 2021  TIME: 4:14 PM    Assessment and Plan   Anxiety [F41 9]  1  Anxiety  buPROPion (WELLBUTRIN SR) 150 mg 12 hr tablet         Patient Instructions     Patient Instructions   incr wellbutrin 150 bid---? lexapro 20 in future          Chief Complaint   No chief complaint on file  History of Present Illness       Wants to incr wellbutrin      Review of Systems   Review of Systems   Constitutional: Negative for fatigue, fever and unexpected weight change  HENT: Negative for congestion, sinus pain and sore throat  Eyes: Negative for visual disturbance  Respiratory: Negative for shortness of breath and wheezing  Cardiovascular: Negative for chest pain and palpitations  Gastrointestinal: Negative for abdominal pain, nausea and vomiting  Musculoskeletal: Negative  Negative for arthralgias and myalgias  Neurological: Negative for syncope, weakness and numbness  Psychiatric/Behavioral: Negative  Negative for confusion, dysphoric mood and suicidal ideas           Current Medications       Current Outpatient Medications:     acetaminophen (TYLENOL) 325 mg tablet, Take 3 tablets (975 mg total) by mouth every 8 (eight) hours, Disp: 30 tablet, Rfl: 0    buPROPion (WELLBUTRIN) 75 mg tablet, TAKE ONE TABLET BY MOUTH TWICE A DAY, Disp: 60 tablet, Rfl: 5    calcium citrate-vitamin d (CELEBRATE CALCIUM CITRATE) 500-500 MG-UNIT CHEW chewable tablet, , Disp: , Rfl:     escitalopram (LEXAPRO) 10 mg tablet, TAKE ONE TABLET BY MOUTH EVERY DAY, Disp: 90 tablet, Rfl: 3    etonogestrel-ethinyl estradiol (NUVARING) 0 12-0 015 MG/24HR vaginal ring, Insert 1 each into the vagina every 28 days Insert vaginally and leave in place for 3 consecutive weeks, then remove for 1 week , Disp: , Rfl:     Iron, Ferrous Sulfate, 142 (45 Fe) MG TBCR, Take by mouth, Disp: , Rfl:    LORazepam (ATIVAN) 1 mg tablet, TAKE ONE TABLET BY MOUTH EVERY 8 HOURS AS NEEDED FOR ANXIETY, Disp: 30 tablet, Rfl: 1    Multiple Vitamins-Minerals (BARIATRIC FUSION) CHEW, , Disp: , Rfl:     buPROPion (WELLBUTRIN SR) 150 mg 12 hr tablet, Take 1 tablet (150 mg total) by mouth 2 (two) times a day, Disp: 60 tablet, Rfl: 5    Current Allergies     Allergies as of 06/04/2021 - Reviewed 06/04/2021   Allergen Reaction Noted    Sulfa antibiotics Hives 03/26/2019            The following portions of the patient's history were reviewed and updated as appropriate: allergies, current medications, past family history, past medical history, past social history, past surgical history and problem list      Past Medical History:   Diagnosis Date    Anxiety     Cholelithiasis     lap ines today 1/25/2021    Depression     Morbid obesity ([address] )     RUEN-Y,surgery 9/2019- wt loss 130 lbs    Obesity     Ovarian cyst     surgicallly removed    PONV (postoperative nausea and vomiting)     "severely" after RUEN-Y    Seasonal allergies     Wears contact lenses        Past Surgical History:   Procedure Laterality Date    CHOLECYSTECTOMY  1/25/2021    CHOLECYSTECTOMY LAPAROSCOPIC N/A 1/25/2021    Procedure: LAPAROSCOPIC CHOLECYSTECTOMY;  Surgeon: Pee Pathak MD;  [facility];  Service: Bariatrics    EGD      OVARIAN CYST REMOVAL      [place] LAP GASTRIC BYPASS/YUNIOR-EN-Y N/A 9/23/2019    Procedure: LAPAROSCOPIC YUNIOR-EN-Y GASTRIC BYPASS AND INTRAOPERATIVE EGD;  Surgeon: Pee Pathak MD;  [facility];  Service: Bariatrics    WISDOM TOOTH EXTRACTION         Family History   Problem Relation Age of Onset    Hypertension Mother     Obesity Mother     Depression Mother     Autoimmune disease Mother         fibromyalgia    Obesity Father     Depression Father    Maciel Aneudy Sister     No Known Problems Brother     Drug abuse Cousin     Stroke Maternal Aunt          Medications have been verified  Objective   /74   Pulse 96   Resp 16   Ht 5' 4 5" (1 638 m)   Wt 80 3 kg (177 lb)   LMP 05/30/2021   SpO2 98%   BMI 29 91 kg/m²        Physical Exam     Physical Exam  Constitutional:       Appearance: She is well-developed  HENT:      Right Ear: Ear canal normal  Tympanic membrane is not injected  Left Ear: Ear canal normal  Tympanic membrane is not injected  Nose: Nose normal    Eyes:      General:         Right eye: No discharge  Left eye: No discharge  Conjunctiva/sclera: Conjunctivae normal       Pupils: Pupils are equal, round, and reactive to light  Neck:      Musculoskeletal: Normal range of motion and neck supple  Thyroid: No thyromegaly  Cardiovascular:      Rate and Rhythm: Normal rate and regular rhythm  Heart sounds: Normal heart sounds  No murmur  Pulmonary:      Effort: Pulmonary effort is normal  No respiratory distress  Breath sounds: Normal breath sounds  No wheezing  Abdominal:      General: Bowel sounds are normal  There is no distension  Palpations: Abdomen is soft  Tenderness: There is no abdominal tenderness  Musculoskeletal: Normal range of motion  Lymphadenopathy:      Cervical: No cervical adenopathy  Skin:     General: Skin is warm and dry  Neurological:      Mental Status: She is alert and oriented to person, place, and time  She is not disoriented  Sensory: No sensory deficit  Gait: Gait normal       Deep Tendon Reflexes: Reflexes are normal and symmetric  Psychiatric:         Speech: Speech normal          Behavior: Behavior normal          Thought Content:  Thought content normal          Judgment: Judgment normal

## 2021-06-07 ENCOUNTER — TELEPHONE (OUTPATIENT)
Dept: ADMINISTRATIVE | Facility: OTHER | Age: 41
End: 2021-06-07

## 2021-06-07 NOTE — TELEPHONE ENCOUNTER
----- [facility] sent at 6/4/2021  3:47 PM EDT -----  Regarding: CERVICAL CANCER SCREENING  06/04/21 3:47 PM    Hello, our patient attached above has had Pap Smear (HPV) aka Cervical Cancer Screening completed/performed  Please assist in updating the patient chart by making an External outreach to [facility] located in [place]   The date of service is 2-3 YEARS AGO    Thank you,  Yamilet Man  [facility]

## 2021-06-07 NOTE — TELEPHONE ENCOUNTER
Upon review of the In Basket request and the patient's chart, initial outreach has been made via telephone call, please see Contacts section for details       Thank you  Martin Turner MA

## 2021-06-07 NOTE — LETTER
Procedure Request Form: Cervical Cancer Screening      Date Requested: 21  Patient: Debora Cheung  Patient : 1980   Referring Provider: Reji Andersen, MD        Date of Procedure ______________________________       The above patient has informed us that they have completed their   most recent Cervical Cancer Screening at your facility  Please complete   this form and attach all corresponding procedure reports/results  Comments __________________________________________________________  ____________________________________________________________________  ____________________________________________________________________  ____________________________________________________________________    Facility Completing Procedure _________________________________________    Form Completed By (print name) _______________________________________      Signature __________________________________________________________      These reports are needed for  compliance    Please fax this completed form and a copy of the procedure report to our office located at [place] [address] as soon as possible to [phone] attention Donna: Phone [phone]    We thank you for your assistance in treating our mutual patient     (sent to [facility] cathy MOELLER/Zhou)

## 2021-06-08 ENCOUNTER — TELEPHONE (OUTPATIENT)
Dept: OBGYN CLINIC | Facility: CLINIC | Age: 41
End: 2021-06-08

## 2021-06-08 NOTE — TELEPHONE ENCOUNTER
Upon review of the In Basket request and the patient's chart, initial outreach has been made via telephone call, please see Contacts section for details       Thank you  Chad Cheung MA

## 2021-06-08 NOTE — TELEPHONE ENCOUNTER
Ree from [facility] called requesting to have most recent pap faxed to [phone] and request a call to confirm new fax information for [facility] @ [phone]  Forwarded to Aracelis Allen in medical records

## 2021-06-11 NOTE — TELEPHONE ENCOUNTER
Upon review of the In Basket request we have found as a result of outreach that patient did not have the requested item(s) completed  Any additional questions or concerns should be emailed to the Practice Liaisons via [email or web address] email, please do not reply via In Basket      Thank you  Rafael To MA

## 2021-11-22 DIAGNOSIS — F41.9 ANXIETY: ICD-10-CM

## 2021-11-23 RX ORDER — BUPROPION HYDROCHLORIDE 150 MG/1
TABLET, EXTENDED RELEASE ORAL
Qty: 60 TABLET | Refills: 5 | Status: SHIPPED | OUTPATIENT
Start: 2021-11-23 | End: 2022-04-27 | Stop reason: SDUPTHER

## 2022-02-02 ENCOUNTER — TELEPHONE (OUTPATIENT)
Dept: BARIATRICS | Facility: CLINIC | Age: 42
End: 2022-02-02

## 2022-02-02 NOTE — TELEPHONE ENCOUNTER
----- Message from Marylou Betancourt RN sent at 2022  8:44 AM EST -----  Regardin year f/u appointment  Please contact patient to schedule a 2 year follow up appointment    Thank You

## 2022-02-02 NOTE — TELEPHONE ENCOUNTER
LM for patient to call office to schedule annual appointment with Kalani or Fifi singleton/theo chacon  Office information left for patient to call at convenience

## 2022-04-25 ENCOUNTER — RA CDI HCC (OUTPATIENT)
Dept: OTHER | Facility: HOSPITAL | Age: 42
End: 2022-04-25

## 2022-04-25 NOTE — PROGRESS NOTES
[facility]  coding opportunities       Chart reviewed, no opportunity found: CHART REVIEWED, NO OPPORTUNITY FOUND        Patients Insurance     Medicare Insurance: [employer] [facility] Advantage

## 2022-04-27 ENCOUNTER — OFFICE VISIT (OUTPATIENT)
Dept: FAMILY MEDICINE CLINIC | Facility: CLINIC | Age: 42
End: 2022-04-27
Payer: COMMERCIAL

## 2022-04-27 VITALS
HEIGHT: 64 IN | BODY MASS INDEX: 31.24 KG/M2 | OXYGEN SATURATION: 100 % | TEMPERATURE: 97.7 F | HEART RATE: 84 BPM | SYSTOLIC BLOOD PRESSURE: 137 MMHG | WEIGHT: 183 LBS | DIASTOLIC BLOOD PRESSURE: 83 MMHG

## 2022-04-27 DIAGNOSIS — F41.9 ANXIETY: ICD-10-CM

## 2022-04-27 DIAGNOSIS — Z13.29 THYROID DISORDER SCREENING: ICD-10-CM

## 2022-04-27 DIAGNOSIS — Z13.0 ENCOUNTER FOR SCREENING FOR HEMATOLOGIC DISORDER: ICD-10-CM

## 2022-04-27 DIAGNOSIS — Z13.39 ADHD (ATTENTION DEFICIT HYPERACTIVITY DISORDER) EVALUATION: ICD-10-CM

## 2022-04-27 DIAGNOSIS — Z00.00 ANNUAL PHYSICAL EXAM: Primary | ICD-10-CM

## 2022-04-27 DIAGNOSIS — Z12.31 SCREENING MAMMOGRAM, ENCOUNTER FOR: ICD-10-CM

## 2022-04-27 DIAGNOSIS — F33.9 DEPRESSION, RECURRENT (HCC): ICD-10-CM

## 2022-04-27 PROCEDURE — 99213 OFFICE O/P EST LOW 20 MIN: CPT | Performed by: NURSE PRACTITIONER

## 2022-04-27 PROCEDURE — 3008F BODY MASS INDEX DOCD: CPT | Performed by: NURSE PRACTITIONER

## 2022-04-27 PROCEDURE — 99396 PREV VISIT EST AGE 40-64: CPT | Performed by: NURSE PRACTITIONER

## 2022-04-27 PROCEDURE — 1036F TOBACCO NON-USER: CPT | Performed by: NURSE PRACTITIONER

## 2022-04-27 PROCEDURE — 3725F SCREEN DEPRESSION PERFORMED: CPT | Performed by: NURSE PRACTITIONER

## 2022-04-27 RX ORDER — BUPROPION HYDROCHLORIDE 150 MG/1
150 TABLET, EXTENDED RELEASE ORAL 2 TIMES DAILY
Qty: 60 TABLET | Refills: 5 | Status: SHIPPED | OUTPATIENT
Start: 2022-04-27

## 2022-04-27 RX ORDER — ESCITALOPRAM OXALATE 10 MG/1
10 TABLET ORAL DAILY
Qty: 90 TABLET | Refills: 3 | Status: SHIPPED | OUTPATIENT
Start: 2022-04-27

## 2022-04-27 NOTE — PROGRESS NOTES
[facility]        NAME: Boo Aldana is a 37 y o  female  : 1980    MRN: [id number]  DATE: 2022  TIME: 9:12 AM    Assessment and Plan   Annual physical exam [Z00 00]  1  Annual physical exam  Lipid panel    Comprehensive metabolic panel    Lipid panel    Comprehensive metabolic panel   2  Screening mammogram, encounter for  Mammo screening bilateral w 3d & cad   3  Anxiety  buPROPion (WELLBUTRIN SR) 150 mg 12 hr tablet   4  Thyroid disorder screening  TSH, 3rd generation with Free T4 reflex    TSH, 3rd generation with Free T4 reflex   5  Encounter for screening for hematologic disorder  CBC and differential    CBC and differential   6  Depression, recurrent (HCC)  escitalopram (LEXAPRO) 10 mg tablet   7  ADHD (attention deficit hyperactivity disorder) evaluation  Ambulatory Referral to Neurology         Patient Instructions     Patient Instructions     Fasting labs and mammogram as ordered  Continue Wellbutrin and Lexapro as directed  Recommend therapy to manage anxiety  F/up with Dr Justin Hill for ADHD evaluation  Call with any problems or concerns  Wellness Visit for Adults   AMBULATORY CARE:   A wellness visit  is when you see your healthcare provider to get screened for health problems  Your healthcare provider will also give you advice on how to stay healthy  Write down your questions so you remember to ask them  Ask your healthcare provider how often you should have a wellness visit  What happens at a wellness visit:  Your healthcare provider will ask about your health, and your family history of health problems  This includes high blood pressure, heart disease, and cancer  He or she will ask if you have symptoms that concern you, if you smoke, and about your mood  You may also be asked about your intake of medicines, supplements, food, and alcohol  Any of the following may be done:  · Your weight  will be checked   Your height may also be checked so your body mass index (BMI) can be calculated  Your BMI shows if you are at a healthy weight  · Your blood pressure  and heart rate will be checked  Your temperature may also be checked  · Blood and urine tests  may be done  Blood tests may be done to check your cholesterol levels  Abnormal cholesterol levels increase your risk for heart disease and stroke  You may also need a blood or urine test to check for diabetes if you are at increased risk  Urine tests may be done to look for signs of an infection or kidney disease  · A physical exam  includes checking your heartbeat and lungs with a stethoscope  Your healthcare provider may also check your skin to look for sun damage  · Screening tests  may be recommended  A screening test is done to check for diseases that may not cause symptoms  The screening tests you may need depend on your age, gender, family history, and lifestyle habits  For example, colorectal screening may be recommended if you are 48years old or older  Screening tests you need if you are a woman:   · A Pap smear  is used to screen for cervical cancer  Pap smears are usually done every 3 to 5 years depending on your age  You may need them more often if you have had abnormal Pap smear test results in the past  Ask your healthcare provider how often you should have a Pap smear  · A mammogram  is an x-ray of your breasts to screen for breast cancer  Experts recommend mammograms every 2 years starting at age 48 years  You may need a mammogram at age 52 years or younger if you have an increased risk for breast cancer  Talk to your healthcare provider about when you should start having mammograms and how often you need them  Vaccines you may need:   · Get an influenza vaccine  every year  The influenza vaccine protects you from the flu  Several types of viruses cause the flu  The viruses change over time, so new vaccines are made each year      · Get a tetanus-diphtheria (Td) booster vaccine  every 10 years  This vaccine protects you against tetanus and diphtheria  Tetanus is a severe infection that may cause painful muscle spasms and lockjaw  Diphtheria is a severe bacterial infection that causes a thick covering in the back of your mouth and throat  · Get a human papillomavirus (HPV) vaccine  if you are female and aged 23 to 32 or male 23 to 24 and never received it  This vaccine protects you from HPV infection  HPV is the most common infection spread by sexual contact  HPV may also cause vaginal, penile, and anal cancers  · Get a pneumococcal vaccine  if you are aged 72 years or older  The pneumococcal vaccine is an injection given to protect you from pneumococcal disease  Pneumococcal disease is an infection caused by pneumococcal bacteria  The infection may cause pneumonia, meningitis, or an ear infection  · Get a shingles vaccine  if you are 60 or older, even if you have had shingles before  The shingles vaccine is an injection to protect you from the varicella-zoster virus  This is the same virus that causes chickenpox  Shingles is a painful rash that develops in people who had chickenpox or have been exposed to the virus  How to eat healthy:  My Plate is a model for planning healthy meals  It shows the types and amounts of foods that should go on your plate  Fruits and vegetables make up about half of your plate, and grains and protein make up the other half  A serving of dairy is included on the side of your plate  The amount of calories and serving sizes you need depends on your age, gender, weight, and height  Examples of healthy foods are listed below:  · Eat a variety of vegetables  such as dark green, red, and orange vegetables  You can also include canned vegetables low in sodium (salt) and frozen vegetables without added butter or sauces  · Eat a variety of fresh fruits , canned fruit in 100% juice, frozen fruit, and dried fruit  · Include whole grains    At least half of the grains you eat should be whole grains  Examples include whole-wheat bread, wheat pasta, brown rice, and whole-grain cereals such as oatmeal     · Eat a variety of protein foods such as seafood (fish and shellfish), lean meat, and poultry without skin (turkey and chicken)  Examples of lean meats include pork leg, shoulder, or tenderloin, and beef round, sirloin, tenderloin, and extra lean ground beef  Other protein foods include eggs and egg substitutes, beans, peas, soy products, nuts, and seeds  · Choose low-fat dairy products such as skim or 1% milk or low-fat yogurt, cheese, and cottage cheese  · Limit unhealthy fats  such as butter, hard margarine, and shortening  Exercise:  Exercise at least 30 minutes per day on most days of the week  Some examples of exercise include walking, biking, dancing, and swimming  You can also fit in more physical activity by taking the stairs instead of the elevator or parking farther away from stores  Include muscle strengthening activities 2 days each week  Regular exercise provides many health benefits  It helps you manage your weight, and decreases your risk for type 2 diabetes, heart disease, stroke, and high blood pressure  Exercise can also help improve your mood  Ask your healthcare provider about the best exercise plan for you  General health and safety guidelines:   · Do not smoke  Nicotine and other chemicals in cigarettes and cigars can cause lung damage  Ask your healthcare provider for information if you currently smoke and need help to quit  E-cigarettes or smokeless tobacco still contain nicotine  Talk to your healthcare provider before you use these products  · Limit alcohol  A drink of alcohol is 12 ounces of beer, 5 ounces of wine, or 1½ ounces of liquor  · Lose weight, if needed  Being overweight increases your risk of certain health conditions   These include heart disease, high blood pressure, type 2 diabetes, and certain types of cancer  · Protect your skin  Do not sunbathe or use tanning beds  Use sunscreen with a SPF 15 or higher  Apply sunscreen at least 15 minutes before you go outside  Reapply sunscreen every 2 hours  Wear protective clothing, hats, and sunglasses when you are outside  · Drive safely  Always wear your seatbelt  Make sure everyone in your car wears a seatbelt  A seatbelt can save your life if you are in an accident  Do not use your cell phone when you are driving  This could distract you and cause an accident  Pull over if you need to make a call or send a text message  · Practice safe sex  Use latex condoms if are sexually active and have more than one partner  Your healthcare provider may recommend screening tests for sexually transmitted infections (STIs)  · Wear helmets, lifejackets, and protective gear  Always wear a helmet when you ride a bike or motorcycle, go skiing, or play sports that could cause a head injury  Wear protective equipment when you play sports  Wear a lifejacket when you are on a boat or doing water sports  © Copyright [employer] 2022 Information is for End User's use only and may not be sold, redistributed or otherwise used for commercial purposes  All illustrations and images included in CareNotes® are the copyrighted property of A D A M , Inc  or [facility] Taras Lutz   The above information is an  only  It is not intended as medical advice for individual conditions or treatments  Talk to your doctor, nurse or pharmacist before following any medical regimen to see if it is safe and effective for you  Chief Complaint     Chief Complaint   Patient presents with    Physical Exam     Discuss ADHD and feng order         History of Present Illness       Medication management for depression/anxiety  Taking Wellbutrin 150 mg bid, and Lexapro  She is prescribed lexapro 10 mg-only taking 1/2 tablet (5mg)-feeling more tired with 10mg   She states anxiety is increased-she is feeling decrease concentration,scatterbrained-discuss ADHD  Not currently going to therapy  Denies any suicidal thoughts, no self harm  Review of Systems   Review of Systems   Constitutional: Negative for activity change, chills, diaphoresis and fever  Respiratory: Negative for chest tightness, shortness of breath and wheezing  Cardiovascular: Negative for chest pain  Musculoskeletal: Negative for myalgias  Skin: Negative for color change and pallor  Neurological: Negative for dizziness, weakness and headaches  Psychiatric/Behavioral: Positive for confusion  Negative for agitation, behavioral problems, decreased concentration, dysphoric mood, hallucinations, self-injury, sleep disturbance and suicidal ideas  The patient is nervous/anxious  The patient is not hyperactive  All other systems reviewed and are negative          Current Medications       Current Outpatient Medications:     acetaminophen (TYLENOL) 325 mg tablet, Take 3 tablets (975 mg total) by mouth every 8 (eight) hours, Disp: 30 tablet, Rfl: 0    buPROPion (WELLBUTRIN SR) 150 mg 12 hr tablet, Take 1 tablet (150 mg total) by mouth 2 (two) times a day, Disp: 60 tablet, Rfl: 5    escitalopram (LEXAPRO) 10 mg tablet, Take 1 tablet (10 mg total) by mouth daily, Disp: 90 tablet, Rfl: 3    etonogestrel-ethinyl estradiol (NUVARING) 0 12-0 015 MG/24HR vaginal ring, Insert 1 each into the vagina every 28 days Insert vaginally and leave in place for 3 consecutive weeks, then remove for 1 week , Disp: , Rfl:     LORazepam (ATIVAN) 1 mg tablet, TAKE ONE TABLET BY MOUTH EVERY 8 HOURS AS NEEDED FOR ANXIETY, Disp: 30 tablet, Rfl: 1    Multiple Vitamins-Minerals (BARIATRIC FUSION) CHEW, , Disp: , Rfl:     calcium citrate-vitamin d (CELEBRATE CALCIUM CITRATE) 500-500 MG-UNIT CHEW chewable tablet, , Disp: , Rfl:     Iron, Ferrous Sulfate, 142 (45 Fe) MG TBCR, Take by mouth (Patient not taking: Reported on 4/27/2022 ), Disp: , Rfl:     Current Allergies     Allergies as of 04/27/2022 - Reviewed 04/27/2022   Allergen Reaction Noted    Sulfa antibiotics Hives 03/26/2019            The following portions of the patient's history were reviewed and updated as appropriate: allergies, current medications, past family history, past medical history, past social history, past surgical history and problem list      Past Medical History:   Diagnosis Date    Anxiety     Cholelithiasis     lap ines today 1/25/2021    Depression     Morbid obesity ([address] )     RUEN-Y,surgery 9/2019- wt loss 130 lbs    Obesity     Ovarian cyst     surgicallly removed    PONV (postoperative nausea and vomiting)     "severely" after RUEN-Y    Seasonal allergies     Wears contact lenses        Past Surgical History:   Procedure Laterality Date    CHOLECYSTECTOMY  1/25/2021    CHOLECYSTECTOMY LAPAROSCOPIC N/A 1/25/2021    Procedure: LAPAROSCOPIC CHOLECYSTECTOMY;  Surgeon: Shelbie Quiñones MD;  Location: [facility];  Service: Bariatrics    EGD      OVARIAN CYST REMOVAL      MS LAP GASTRIC BYPASS/YUNIOR-EN-Y N/A 9/23/2019    Procedure: LAPAROSCOPIC YUNIOR-EN-Y GASTRIC BYPASS AND INTRAOPERATIVE EGD;  Surgeon: Shelbie Quiñones MD;  Location: [facility];  Service: Bariatrics    WISDOM TOOTH EXTRACTION         Family History   Problem Relation Age of Onset    Hypertension Mother     Obesity Mother     Depression Mother     Autoimmune disease Mother         fibromyalgia    Obesity Father     Depression Father    Caresse Shira Sister     No Known Problems Brother     Drug abuse Cousin     Stroke Maternal Aunt          Medications have been verified  Objective   /83 (BP Location: Left arm, Patient Position: Sitting, Cuff Size: Large)   Pulse 84   Temp 97 7 °F (36 5 °C) (Tympanic)   Ht 5' 4" (1 626 m)   Wt 83 kg (183 lb)   SpO2 100%   BMI 31 41 kg/m²        Physical Exam     Physical Exam  Vitals and nursing note reviewed  Constitutional:       General: She is not in acute distress  Appearance: Normal appearance  She is not ill-appearing  HENT:      Head: Normocephalic  Eyes:      Extraocular Movements: Extraocular movements intact  Pupils: Pupils are equal, round, and reactive to light  Cardiovascular:      Rate and Rhythm: Normal rate and regular rhythm  Heart sounds: Normal heart sounds  No murmur heard  No friction rub  No gallop  Pulmonary:      Effort: Pulmonary effort is normal  No respiratory distress  Breath sounds: Normal breath sounds  No wheezing  Musculoskeletal:         General: Normal range of motion  Cervical back: Normal range of motion and neck supple  Skin:     General: Skin is warm and dry  Neurological:      Mental Status: She is alert and oriented to person, place, and time  Psychiatric:         Mood and Affect: Mood normal          Behavior: Behavior normal          Thought Content: Thought content normal          Judgment: Judgment normal          PHQ-2/9 Depression Screening    Little interest or pleasure in doing things: 1 - several days  Feeling down, depressed, or hopeless: 1 - several days  Trouble falling or staying asleep, or sleeping too much: 1 - several days  Feeling tired or having little energy: 1 - several days  Poor appetite or overeatin - not at all  Feeling bad about yourself - or that you are a failure or have let yourself or your family down: 1 - several days  Trouble concentrating on things, such as reading the newspaper or watching television: 2 - more than half the days  Moving or speaking so slowly that other people could have noticed   Or the opposite - being so fidgety or restless that you have been moving around a lot more than usual: 0 - not at all  Thoughts that you would be better off dead, or of hurting yourself in some way: 0 - not at all  PHQ-9 Score: 7   PHQ-9 Interpretation: Mild depression

## 2022-04-27 NOTE — PATIENT INSTRUCTIONS
Fasting labs and mammogram as ordered  Continue Wellbutrin and Lexapro as directed  Recommend therapy to manage anxiety  F/up with Dr Hi Figures for ADHD evaluation  Call with any problems or concerns  Wellness Visit for Adults   AMBULATORY CARE:   A wellness visit  is when you see your healthcare provider to get screened for health problems  Your healthcare provider will also give you advice on how to stay healthy  Write down your questions so you remember to ask them  Ask your healthcare provider how often you should have a wellness visit  What happens at a wellness visit:  Your healthcare provider will ask about your health, and your family history of health problems  This includes high blood pressure, heart disease, and cancer  He or she will ask if you have symptoms that concern you, if you smoke, and about your mood  You may also be asked about your intake of medicines, supplements, food, and alcohol  Any of the following may be done:  · Your weight  will be checked  Your height may also be checked so your body mass index (BMI) can be calculated  Your BMI shows if you are at a healthy weight  · Your blood pressure  and heart rate will be checked  Your temperature may also be checked  · Blood and urine tests  may be done  Blood tests may be done to check your cholesterol levels  Abnormal cholesterol levels increase your risk for heart disease and stroke  You may also need a blood or urine test to check for diabetes if you are at increased risk  Urine tests may be done to look for signs of an infection or kidney disease  · A physical exam  includes checking your heartbeat and lungs with a stethoscope  Your healthcare provider may also check your skin to look for sun damage  · Screening tests  may be recommended  A screening test is done to check for diseases that may not cause symptoms  The screening tests you may need depend on your age, gender, family history, and lifestyle habits   For example, colorectal screening may be recommended if you are 48years old or older  Screening tests you need if you are a woman:   · A Pap smear  is used to screen for cervical cancer  Pap smears are usually done every 3 to 5 years depending on your age  You may need them more often if you have had abnormal Pap smear test results in the past  Ask your healthcare provider how often you should have a Pap smear  · A mammogram  is an x-ray of your breasts to screen for breast cancer  Experts recommend mammograms every 2 years starting at age 48 years  You may need a mammogram at age 52 years or younger if you have an increased risk for breast cancer  Talk to your healthcare provider about when you should start having mammograms and how often you need them  Vaccines you may need:   · Get an influenza vaccine  every year  The influenza vaccine protects you from the flu  Several types of viruses cause the flu  The viruses change over time, so new vaccines are made each year  · Get a tetanus-diphtheria (Td) booster vaccine  every 10 years  This vaccine protects you against tetanus and diphtheria  Tetanus is a severe infection that may cause painful muscle spasms and lockjaw  Diphtheria is a severe bacterial infection that causes a thick covering in the back of your mouth and throat  · Get a human papillomavirus (HPV) vaccine  if you are female and aged 23 to 32 or male 23 to 24 and never received it  This vaccine protects you from HPV infection  HPV is the most common infection spread by sexual contact  HPV may also cause vaginal, penile, and anal cancers  · Get a pneumococcal vaccine  if you are aged 72 years or older  The pneumococcal vaccine is an injection given to protect you from pneumococcal disease  Pneumococcal disease is an infection caused by pneumococcal bacteria  The infection may cause pneumonia, meningitis, or an ear infection      · Get a shingles vaccine  if you are 60 or older, even if you have had shingles before  The shingles vaccine is an injection to protect you from the varicella-zoster virus  This is the same virus that causes chickenpox  Shingles is a painful rash that develops in people who had chickenpox or have been exposed to the virus  How to eat healthy:  My Plate is a model for planning healthy meals  It shows the types and amounts of foods that should go on your plate  Fruits and vegetables make up about half of your plate, and grains and protein make up the other half  A serving of dairy is included on the side of your plate  The amount of calories and serving sizes you need depends on your age, gender, weight, and height  Examples of healthy foods are listed below:  · Eat a variety of vegetables  such as dark green, red, and orange vegetables  You can also include canned vegetables low in sodium (salt) and frozen vegetables without added butter or sauces  · Eat a variety of fresh fruits , canned fruit in 100% juice, frozen fruit, and dried fruit  · Include whole grains  At least half of the grains you eat should be whole grains  Examples include whole-wheat bread, wheat pasta, brown rice, and whole-grain cereals such as oatmeal     · Eat a variety of protein foods such as seafood (fish and shellfish), lean meat, and poultry without skin (turkey and chicken)  Examples of lean meats include pork leg, shoulder, or tenderloin, and beef round, sirloin, tenderloin, and extra lean ground beef  Other protein foods include eggs and egg substitutes, beans, peas, soy products, nuts, and seeds  · Choose low-fat dairy products such as skim or 1% milk or low-fat yogurt, cheese, and cottage cheese  · Limit unhealthy fats  such as butter, hard margarine, and shortening  Exercise:  Exercise at least 30 minutes per day on most days of the week  Some examples of exercise include walking, biking, dancing, and swimming   You can also fit in more physical activity by taking the stairs instead of the elevator or parking farther away from stores  Include muscle strengthening activities 2 days each week  Regular exercise provides many health benefits  It helps you manage your weight, and decreases your risk for type 2 diabetes, heart disease, stroke, and high blood pressure  Exercise can also help improve your mood  Ask your healthcare provider about the best exercise plan for you  General health and safety guidelines:   · Do not smoke  Nicotine and other chemicals in cigarettes and cigars can cause lung damage  Ask your healthcare provider for information if you currently smoke and need help to quit  E-cigarettes or smokeless tobacco still contain nicotine  Talk to your healthcare provider before you use these products  · Limit alcohol  A drink of alcohol is 12 ounces of beer, 5 ounces of wine, or 1½ ounces of liquor  · Lose weight, if needed  Being overweight increases your risk of certain health conditions  These include heart disease, high blood pressure, type 2 diabetes, and certain types of cancer  · Protect your skin  Do not sunbathe or use tanning beds  Use sunscreen with a SPF 15 or higher  Apply sunscreen at least 15 minutes before you go outside  Reapply sunscreen every 2 hours  Wear protective clothing, hats, and sunglasses when you are outside  · Drive safely  Always wear your seatbelt  Make sure everyone in your car wears a seatbelt  A seatbelt can save your life if you are in an accident  Do not use your cell phone when you are driving  This could distract you and cause an accident  Pull over if you need to make a call or send a text message  · Practice safe sex  Use latex condoms if are sexually active and have more than one partner  Your healthcare provider may recommend screening tests for sexually transmitted infections (STIs)  · Wear helmets, lifejackets, and protective gear    Always wear a helmet when you ride a bike or motorcycle, go skiing, or play sports that could cause a head injury  Wear protective equipment when you play sports  Wear a lifejacket when you are on a boat or doing water sports  © Copyright [employer] 2022 Information is for End User's use only and may not be sold, redistributed or otherwise used for commercial purposes  All illustrations and images included in CareNotes® are the copyrighted property of [employer] M , Inc  or [employer]   The above information is an  only  It is not intended as medical advice for individual conditions or treatments  Talk to your doctor, nurse or pharmacist before following any medical regimen to see if it is safe and effective for you

## 2022-04-27 NOTE — PROGRESS NOTES
[address]    NAME: Kimberlyn Garnica  AGE: 37 y o  SEX: female  : 1980     DATE: 2022     Assessment and Plan:     Problem List Items Addressed This Visit        Other    Anxiety    Relevant Medications    buPROPion (WELLBUTRIN SR) 150 mg 12 hr tablet    Depression, recurrent (HCC)    Relevant Medications    escitalopram (LEXAPRO) 10 mg tablet    buPROPion (WELLBUTRIN SR) 150 mg 12 hr tablet      Other Visit Diagnoses     Annual physical exam    -  Primary    Relevant Orders    Lipid panel    Comprehensive metabolic panel    Screening mammogram, encounter for        Relevant Orders    Mammo screening bilateral w 3d & cad    Thyroid disorder screening        Relevant Orders    TSH, 3rd generation with Free T4 reflex    Encounter for screening for hematologic disorder        Relevant Orders    CBC and differential    ADHD (attention deficit hyperactivity disorder) evaluation        Relevant Orders    Ambulatory Referral to Neurology          Immunizations and preventive care screenings were discussed with patient today  Appropriate education was printed on patient's after visit summary  Counseling:  Alcohol/drug use: discussed moderation in alcohol intake, the recommendations for healthy alcohol use, and avoidance of illicit drug use  Dental Health: discussed importance of regular tooth brushing, flossing, and dental visits  Injury prevention: discussed safety/seat belts, safety helmets, smoke detectors, carbon dioxide detectors, and smoking near bedding or upholstery  Sexual health: discussed sexually transmitted diseases, partner selection, use of condoms, avoidance of unintended pregnancy, and contraceptive alternatives  · Exercise: the importance of regular exercise/physical activity was discussed  Recommend exercise 3-5 times per week for at least 30 minutes  No follow-ups on file  Chief Complaint:     Chief Complaint   Patient presents with    Physical Exam     Discuss ADHD and feng order      History of Present Illness:     Adult Annual Physical   Patient here for a comprehensive physical exam  The patient reports no problems  Diet and Physical Activity  · Diet/Nutrition: well balanced diet  · Exercise: no formal exercise  Depression Screening  PHQ-2/9 Depression Screening    Little interest or pleasure in doing things: 1 - several days  Feeling down, depressed, or hopeless: 1 - several days  Trouble falling or staying asleep, or sleeping too much: 1 - several days  Feeling tired or having little energy: 1 - several days  Poor appetite or overeatin - not at all  Feeling bad about yourself - or that you are a failure or have let yourself or your family down: 1 - several days  Trouble concentrating on things, such as reading the newspaper or watching television: 2 - more than half the days  Moving or speaking so slowly that other people could have noticed  Or the opposite - being so fidgety or restless that you have been moving around a lot more than usual: 0 - not at all  Thoughts that you would be better off dead, or of hurting yourself in some way: 0 - not at all  PHQ-9 Score: 7   PHQ-9 Interpretation: Mild depression        General Health  · Sleep: sleeps well  · Hearing: normal - bilateral   · Vision: goes for regular eye exams  · Dental: regular dental visits  /GYN Health  · Patient is: perimenopausal  · Last menstrual period:  · Contraceptive method: Nuva ring  Review of Systems:     Review of Systems   Constitutional: Negative for activity change, appetite change, chills, diaphoresis, fatigue and fever  HENT: Negative for congestion, dental problem, ear pain, facial swelling, sinus pressure, sinus pain, sore throat and trouble swallowing  Eyes: Negative for photophobia, pain, discharge, redness, itching and visual disturbance     Respiratory: Negative for cough, chest tightness, shortness of breath and wheezing  Cardiovascular: Negative for chest pain, palpitations and leg swelling  Gastrointestinal: Negative for abdominal distention, abdominal pain, anal bleeding, blood in stool, constipation, diarrhea, nausea, rectal pain and vomiting  Endocrine: Negative for cold intolerance, heat intolerance, polydipsia, polyphagia and polyuria  Genitourinary: Negative for decreased urine volume, difficulty urinating, dysuria, flank pain, frequency and urgency  Musculoskeletal: Negative for arthralgias, back pain, gait problem, joint swelling, myalgias, neck pain and neck stiffness  Skin: Negative for color change, pallor, rash and wound  Neurological: Negative for dizziness, tremors, seizures, syncope, facial asymmetry, speech difficulty, weakness, light-headedness, numbness and headaches  Hematological: Negative for adenopathy  Does not bruise/bleed easily  Psychiatric/Behavioral: Positive for decreased concentration  Negative for agitation, behavioral problems, confusion, dysphoric mood, hallucinations, self-injury, sleep disturbance and suicidal ideas  The patient is nervous/anxious         Past Medical History:     Past Medical History:   Diagnosis Date    Anxiety     Cholelithiasis     lap ines today 1/25/2021    Depression     Morbid obesity ([address] )     RUEN-Y,surgery 9/2019- wt loss 130 lbs    Obesity     Ovarian cyst     surgicallly removed    PONV (postoperative nausea and vomiting)     "severely" after RUEN-Y    Seasonal allergies     Wears contact lenses       Past Surgical History:     Past Surgical History:   Procedure Laterality Date    CHOLECYSTECTOMY  1/25/2021    CHOLECYSTECTOMY LAPAROSCOPIC N/A 1/25/2021    Procedure: LAPAROSCOPIC CHOLECYSTECTOMY;  Surgeon: Skinny Au MD;  Location: [facility];  Service: Bariatrics    EGD      OVARIAN CYST REMOVAL      [place] LAP GASTRIC BYPASS/YUNIOR-EN-Y N/A 9/23/2019    Procedure: LAPAROSCOPIC YUNIOR-EN-Y GASTRIC BYPASS AND INTRAOPERATIVE EGD;  Surgeon: Uvaldo Eaton MD;  Location: [facility];  Service: Bariatrics    WISDOM TOOTH EXTRACTION        Social History:     Social History     Socioeconomic History    Marital status: /Civil Union     Spouse name: None    Number of children: None    Years of education: None    Highest education level: None   Occupational History    None   Tobacco Use    Smoking status: Former Smoker     Packs/day: 0 50     Years: 10 00     Pack years: 5 00     Types: Cigarettes     Quit date: 2013     Years since quittin 8    Smokeless tobacco: Never Used   Vaping Use    Vaping Use: Never used   Substance and Sexual Activity    Alcohol use: Never     Comment: rarely drink    Drug use: Not Currently     Types: Marijuana    Sexual activity: Yes     Partners: Male     Birth control/protection: Ring   Other Topics Concern    None   Social History Narrative    None     Social Determinants of Health     Financial Resource Strain: Not on file   Food Insecurity: Not on file   Transportation Needs: Not on file   Physical Activity: Not on file   Stress: Not on file   Social Connections: Not on file   Intimate Partner Violence: Not on file   Housing Stability: Not on file      Family History:     Family History   Problem Relation Age of Onset    Hypertension Mother     Obesity Mother     Depression Mother     Autoimmune disease Mother         fibromyalgia    Obesity Father     Depression Father     Bulemia Sister     No Known Problems Brother     Drug abuse Cousin     Stroke Maternal Aunt       Current Medications:     Current Outpatient Medications   Medication Sig Dispense Refill    acetaminophen (TYLENOL) 325 mg tablet Take 3 tablets (975 mg total) by mouth every 8 (eight) hours 30 tablet 0    buPROPion (WELLBUTRIN SR) 150 mg 12 hr tablet Take 1 tablet (150 mg total) by mouth 2 (two) times a day 60 tablet 5    escitalopram (LEXAPRO) 10 mg tablet Take 1 tablet (10 mg total) by mouth daily 90 tablet 3    etonogestrel-ethinyl estradiol (NUVARING) 0 12-0 015 MG/24HR vaginal ring Insert 1 each into the vagina every 28 days Insert vaginally and leave in place for 3 consecutive weeks, then remove for 1 week   LORazepam (ATIVAN) 1 mg tablet TAKE ONE TABLET BY MOUTH EVERY 8 HOURS AS NEEDED FOR ANXIETY 30 tablet 1    Multiple Vitamins-Minerals (BARIATRIC FUSION) CHEW       calcium citrate-vitamin d (CELEBRATE CALCIUM CITRATE) 500-500 MG-UNIT CHEW chewable tablet  (Patient not taking: Reported on 4/27/2022 )      Iron, Ferrous Sulfate, 142 (45 Fe) MG TBCR Take by mouth (Patient not taking: Reported on 4/27/2022 )       No current facility-administered medications for this visit  Allergies: Allergies   Allergen Reactions    Sulfa Antibiotics Hives      Physical Exam:     /83 (BP Location: Left arm, Patient Position: Sitting, Cuff Size: Large)   Pulse 84   Temp 97 7 °F (36 5 °C) (Tympanic)   Ht 5' 4" (1 626 m)   Wt 83 kg (183 lb)   SpO2 100%   BMI 31 41 kg/m²     Physical Exam  Vitals and nursing note reviewed  Constitutional:       General: She is not in acute distress  Appearance: Normal appearance  She is normal weight  She is not ill-appearing, toxic-appearing or diaphoretic  HENT:      Head: Normocephalic  Right Ear: Tympanic membrane, ear canal and external ear normal  There is no impacted cerumen  Left Ear: Tympanic membrane, ear canal and external ear normal  There is no impacted cerumen  Nose: Nose normal  No congestion or rhinorrhea  Mouth/Throat:      Mouth: Mucous membranes are moist       Pharynx: Oropharynx is clear  No oropharyngeal exudate or posterior oropharyngeal erythema  Eyes:      General: No scleral icterus  Right eye: No discharge  Left eye: No discharge  Extraocular Movements: Extraocular movements intact        Conjunctiva/sclera: Conjunctivae normal  Pupils: Pupils are equal, round, and reactive to light  Neck:      Vascular: No carotid bruit  Cardiovascular:      Rate and Rhythm: Normal rate and regular rhythm  Pulses: Normal pulses  Heart sounds: Normal heart sounds  No murmur heard  No friction rub  No gallop  Pulmonary:      Effort: Pulmonary effort is normal  No respiratory distress  Breath sounds: Normal breath sounds  No stridor  No wheezing, rhonchi or rales  Chest:      Chest wall: No tenderness  Abdominal:      General: Abdomen is flat  Bowel sounds are normal  There is no distension  Palpations: Abdomen is soft  There is no mass  Tenderness: There is no abdominal tenderness  There is no right CVA tenderness, left CVA tenderness, guarding or rebound  Hernia: No hernia is present  Musculoskeletal:         General: No swelling, tenderness, deformity or signs of injury  Normal range of motion  Cervical back: Normal range of motion and neck supple  No rigidity or tenderness  No muscular tenderness  Right lower leg: No edema  Left lower leg: No edema  Lymphadenopathy:      Cervical: No cervical adenopathy  Skin:     General: Skin is warm  Capillary Refill: Capillary refill takes less than 2 seconds  Coloration: Skin is not jaundiced or pale  Findings: No bruising, erythema, lesion or rash  Neurological:      General: No focal deficit present  Mental Status: She is alert and oriented to person, place, and time  Cranial Nerves: No cranial nerve deficit  Sensory: No sensory deficit  Motor: No weakness  Coordination: Coordination normal       Gait: Gait normal       Deep Tendon Reflexes: Reflexes normal    Psychiatric:         Mood and Affect: Mood normal          Behavior: Behavior normal          Thought Content:  Thought content normal          Judgment: Judgment normal         PHQ-2/9 Depression Screening    Little interest or pleasure in doing things: 1 - several days  Feeling down, depressed, or hopeless: 1 - several days  Trouble falling or staying asleep, or sleeping too much: 1 - several days  Feeling tired or having little energy: 1 - several days  Poor appetite or overeatin - not at all  Feeling bad about yourself - or that you are a failure or have let yourself or your family down: 1 - several days  Trouble concentrating on things, such as reading the newspaper or watching television: 2 - more than half the days  Moving or speaking so slowly that other people could have noticed   Or the opposite - being so fidgety or restless that you have been moving around a lot more than usual: 0 - not at all  Thoughts that you would be better off dead, or of hurting yourself in some way: 0 - not at all  PHQ-9 Score: 7   PHQ-9 Interpretation: Mild depression          Reina Molina, [address] [place] [address]

## 2022-05-05 ENCOUNTER — RA CDI HCC (OUTPATIENT)
Dept: OTHER | Facility: HOSPITAL | Age: 42
End: 2022-05-05

## 2022-05-12 ENCOUNTER — OFFICE VISIT (OUTPATIENT)
Dept: FAMILY MEDICINE CLINIC | Facility: CLINIC | Age: 42
End: 2022-05-12
Payer: COMMERCIAL

## 2022-05-12 VITALS
OXYGEN SATURATION: 99 % | DIASTOLIC BLOOD PRESSURE: 72 MMHG | BODY MASS INDEX: 31.24 KG/M2 | HEART RATE: 95 BPM | HEIGHT: 64 IN | RESPIRATION RATE: 16 BRPM | SYSTOLIC BLOOD PRESSURE: 124 MMHG | WEIGHT: 183 LBS

## 2022-05-12 DIAGNOSIS — F33.9 DEPRESSION, RECURRENT (HCC): ICD-10-CM

## 2022-05-12 DIAGNOSIS — F90.2 ATTENTION DEFICIT HYPERACTIVITY DISORDER (ADHD), COMBINED TYPE: Primary | ICD-10-CM

## 2022-05-12 PROCEDURE — 3008F BODY MASS INDEX DOCD: CPT | Performed by: FAMILY MEDICINE

## 2022-05-12 PROCEDURE — 1036F TOBACCO NON-USER: CPT | Performed by: FAMILY MEDICINE

## 2022-05-12 PROCEDURE — 99214 OFFICE O/P EST MOD 30 MIN: CPT | Performed by: FAMILY MEDICINE

## 2022-05-12 RX ORDER — DEXTROAMPHETAMINE SACCHARATE, AMPHETAMINE ASPARTATE, DEXTROAMPHETAMINE SULFATE AND AMPHETAMINE SULFATE 5; 5; 5; 5 MG/1; MG/1; MG/1; MG/1
20 TABLET ORAL
Qty: 60 TABLET | Refills: 0 | Status: SHIPPED | OUTPATIENT
Start: 2022-05-12 | End: 2022-06-13 | Stop reason: SDUPTHER

## 2022-05-12 NOTE — PROGRESS NOTES
Assessment/Plan:    No problem-specific Assessment & Plan notes found for this encounter  Diagnoses and all orders for this visit:    Attention deficit hyperactivity disorder (ADHD), combined type          Subjective:   Chief Complaint   Patient presents with    Follow-up     ADD FOLLOW UP        Patient ID: Briana Arango is a 37 y o  female  C/o ADD---did well meds in past      The following portions of the patient's history were reviewed and updated as appropriate: allergies, current medications, past family history, past medical history, past social history, past surgical history and problem list     Review of Systems   Constitutional: Negative for fatigue, fever and unexpected weight change  HENT: Negative for congestion, sinus pain and sore throat  Eyes: Negative for visual disturbance  Respiratory: Negative for shortness of breath and wheezing  Cardiovascular: Negative for chest pain and palpitations  Gastrointestinal: Negative for abdominal pain, nausea and vomiting  Musculoskeletal: Negative  Negative for arthralgias and myalgias  Neurological: Negative for syncope, weakness and numbness  Psychiatric/Behavioral: Negative  Negative for confusion, dysphoric mood and suicidal ideas  Objective:  Vitals:    05/12/22 0726   BP: 124/72   Pulse: 95   Resp: 16   SpO2: 99%   Weight: 83 kg (183 lb)   Height: 5' 4" (1 626 m)      Physical Exam  Constitutional:       Appearance: She is well-developed  HENT:      Right Ear: Ear canal normal  Tympanic membrane is not injected  Left Ear: Ear canal normal  Tympanic membrane is not injected  Nose: Nose normal    Eyes:      General:         Right eye: No discharge  Left eye: No discharge  Conjunctiva/sclera: Conjunctivae normal       Pupils: Pupils are equal, round, and reactive to light  Neck:      Thyroid: No thyromegaly  Cardiovascular:      Rate and Rhythm: Normal rate and regular rhythm        Heart sounds: Normal heart sounds  No murmur heard  Pulmonary:      Effort: Pulmonary effort is normal  No respiratory distress  Breath sounds: Normal breath sounds  No wheezing  Abdominal:      General: Bowel sounds are normal  There is no distension  Palpations: Abdomen is soft  Tenderness: There is no abdominal tenderness  Musculoskeletal:         General: Normal range of motion  Cervical back: Normal range of motion and neck supple  Lymphadenopathy:      Cervical: No cervical adenopathy  Skin:     General: Skin is warm and dry  Neurological:      Mental Status: She is alert and oriented to person, place, and time  She is not disoriented  Sensory: No sensory deficit  Gait: Gait normal       Deep Tendon Reflexes: Reflexes are normal and symmetric  Psychiatric:         Speech: Speech normal          Behavior: Behavior normal          Thought Content:  Thought content normal          Judgment: Judgment normal

## 2022-05-19 LAB
ALBUMIN SERPL-MCNC: 4.2 G/DL (ref 3.8–4.8)
ALBUMIN/GLOB SERPL: 1.8 {RATIO} (ref 1.2–2.2)
ALP SERPL-CCNC: 51 IU/L (ref 44–121)
ALT SERPL-CCNC: 26 IU/L (ref 0–32)
AST SERPL-CCNC: 23 IU/L (ref 0–40)
BASOPHILS # BLD AUTO: 0.1 X10E3/UL (ref 0–0.2)
BASOPHILS NFR BLD AUTO: 2 %
BILIRUB SERPL-MCNC: 0.4 MG/DL (ref 0–1.2)
BUN SERPL-MCNC: 9 MG/DL (ref 6–24)
BUN/CREAT SERPL: 13 (ref 9–23)
CALCIUM SERPL-MCNC: 9.5 MG/DL (ref 8.7–10.2)
CHLORIDE SERPL-SCNC: 103 MMOL/L (ref 96–106)
CHOLEST SERPL-MCNC: 124 MG/DL (ref 100–199)
CHOLEST/HDLC SERPL: 2 RATIO (ref 0–4.4)
CO2 SERPL-SCNC: 23 MMOL/L (ref 20–29)
CREAT SERPL-MCNC: 0.69 MG/DL (ref 0.57–1)
EGFR: 112 ML/MIN/1.73
EOSINOPHIL # BLD AUTO: 0.3 X10E3/UL (ref 0–0.4)
EOSINOPHIL NFR BLD AUTO: 5 %
ERYTHROCYTE [DISTWIDTH] IN BLOOD BY AUTOMATED COUNT: 11.8 % (ref 11.7–15.4)
GLOBULIN SER-MCNC: 2.3 G/DL (ref 1.5–4.5)
GLUCOSE SERPL-MCNC: 90 MG/DL (ref 65–99)
HCT VFR BLD AUTO: 39.8 % (ref 34–46.6)
HDLC SERPL-MCNC: 63 MG/DL
HGB BLD-MCNC: 13.7 G/DL (ref 11.1–15.9)
IMM GRANULOCYTES # BLD: 0 X10E3/UL (ref 0–0.1)
IMM GRANULOCYTES NFR BLD: 0 %
LDLC SERPL CALC-MCNC: 49 MG/DL (ref 0–99)
LYMPHOCYTES # BLD AUTO: 2 X10E3/UL (ref 0.7–3.1)
LYMPHOCYTES NFR BLD AUTO: 35 %
MCH RBC QN AUTO: 31.9 PG (ref 26.6–33)
MCHC RBC AUTO-ENTMCNC: 34.4 G/DL (ref 31.5–35.7)
MCV RBC AUTO: 93 FL (ref 79–97)
MONOCYTES # BLD AUTO: 0.3 X10E3/UL (ref 0.1–0.9)
MONOCYTES NFR BLD AUTO: 6 %
NEUTROPHILS # BLD AUTO: 2.9 X10E3/UL (ref 1.4–7)
NEUTROPHILS NFR BLD AUTO: 52 %
PLATELET # BLD AUTO: 264 X10E3/UL (ref 150–450)
POTASSIUM SERPL-SCNC: 4.4 MMOL/L (ref 3.5–5.2)
PROT SERPL-MCNC: 6.5 G/DL (ref 6–8.5)
RBC # BLD AUTO: 4.29 X10E6/UL (ref 3.77–5.28)
SL AMB VLDL CHOLESTEROL CALC: 12 MG/DL (ref 5–40)
SODIUM SERPL-SCNC: 139 MMOL/L (ref 134–144)
TRIGL SERPL-MCNC: 54 MG/DL (ref 0–149)
TSH SERPL DL<=0.005 MIU/L-ACNC: 0.95 UIU/ML (ref 0.45–4.5)
WBC # BLD AUTO: 5.6 X10E3/UL (ref 3.4–10.8)

## 2022-05-20 ENCOUNTER — TELEPHONE (OUTPATIENT)
Dept: FAMILY MEDICINE CLINIC | Facility: CLINIC | Age: 42
End: 2022-05-20

## 2022-05-20 NOTE — TELEPHONE ENCOUNTER
----- Message from [address] [place] [address],[address] sent at 5/20/2022  8:27 AM EDT -----  Labs are normal

## 2022-05-25 ENCOUNTER — HOSPITAL ENCOUNTER (OUTPATIENT)
Dept: MAMMOGRAPHY | Facility: IMAGING CENTER | Age: 42
Discharge: HOME | End: 2022-05-25
Payer: COMMERCIAL

## 2022-05-25 VITALS — HEIGHT: 64 IN | WEIGHT: 183 LBS | BODY MASS INDEX: 31.24 KG/M2

## 2022-05-25 DIAGNOSIS — Z12.31 SCREENING MAMMOGRAM, ENCOUNTER FOR: ICD-10-CM

## 2022-05-25 PROCEDURE — 77067 SCR MAMMO BI INCL CAD: CPT

## 2022-05-25 PROCEDURE — 77063 BREAST TOMOSYNTHESIS BI: CPT

## 2022-05-31 ENCOUNTER — TELEPHONE (OUTPATIENT)
Dept: FAMILY MEDICINE CLINIC | Facility: CLINIC | Age: 42
End: 2022-05-31

## 2022-05-31 NOTE — TELEPHONE ENCOUNTER
----- Message from [address] [place] [address],[address] sent at 5/31/2022  8:16 AM EDT -----  Normal mammogram  Routine screening in 1 year

## 2022-06-13 DIAGNOSIS — F90.2 ATTENTION DEFICIT HYPERACTIVITY DISORDER (ADHD), COMBINED TYPE: ICD-10-CM

## 2022-06-13 RX ORDER — DEXTROAMPHETAMINE SACCHARATE, AMPHETAMINE ASPARTATE, DEXTROAMPHETAMINE SULFATE AND AMPHETAMINE SULFATE 5; 5; 5; 5 MG/1; MG/1; MG/1; MG/1
20 TABLET ORAL
Qty: 60 TABLET | Refills: 0 | Status: SHIPPED | OUTPATIENT
Start: 2022-06-13 | End: 2022-07-16 | Stop reason: SDUPTHER

## 2022-07-16 DIAGNOSIS — F90.2 ATTENTION DEFICIT HYPERACTIVITY DISORDER (ADHD), COMBINED TYPE: ICD-10-CM

## 2022-07-16 RX ORDER — DEXTROAMPHETAMINE SACCHARATE, AMPHETAMINE ASPARTATE, DEXTROAMPHETAMINE SULFATE AND AMPHETAMINE SULFATE 5; 5; 5; 5 MG/1; MG/1; MG/1; MG/1
20 TABLET ORAL
Qty: 60 TABLET | Refills: 0 | Status: SHIPPED | OUTPATIENT
Start: 2022-07-16 | End: 2022-07-22 | Stop reason: SDUPTHER

## 2022-07-22 ENCOUNTER — PATIENT MESSAGE (OUTPATIENT)
Dept: FAMILY MEDICINE CLINIC | Facility: CLINIC | Age: 42
End: 2022-07-22

## 2022-07-22 DIAGNOSIS — F90.2 ATTENTION DEFICIT HYPERACTIVITY DISORDER (ADHD), COMBINED TYPE: ICD-10-CM

## 2022-07-23 RX ORDER — DEXTROAMPHETAMINE SACCHARATE, AMPHETAMINE ASPARTATE, DEXTROAMPHETAMINE SULFATE AND AMPHETAMINE SULFATE 5; 5; 5; 5 MG/1; MG/1; MG/1; MG/1
20 TABLET ORAL
Qty: 60 TABLET | Refills: 0 | Status: SHIPPED | OUTPATIENT
Start: 2022-07-23 | End: 2022-08-24 | Stop reason: SDUPTHER

## 2022-08-24 DIAGNOSIS — F90.2 ATTENTION DEFICIT HYPERACTIVITY DISORDER (ADHD), COMBINED TYPE: ICD-10-CM

## 2022-08-24 RX ORDER — DEXTROAMPHETAMINE SACCHARATE, AMPHETAMINE ASPARTATE, DEXTROAMPHETAMINE SULFATE AND AMPHETAMINE SULFATE 5; 5; 5; 5 MG/1; MG/1; MG/1; MG/1
20 TABLET ORAL
Qty: 60 TABLET | Refills: 0 | Status: SHIPPED | OUTPATIENT
Start: 2022-08-24 | End: 2022-09-26 | Stop reason: SDUPTHER

## 2022-09-26 DIAGNOSIS — F90.2 ATTENTION DEFICIT HYPERACTIVITY DISORDER (ADHD), COMBINED TYPE: ICD-10-CM

## 2022-09-26 RX ORDER — DEXTROAMPHETAMINE SACCHARATE, AMPHETAMINE ASPARTATE, DEXTROAMPHETAMINE SULFATE AND AMPHETAMINE SULFATE 5; 5; 5; 5 MG/1; MG/1; MG/1; MG/1
20 TABLET ORAL
Qty: 60 TABLET | Refills: 0 | Status: SHIPPED | OUTPATIENT
Start: 2022-09-26

## 2022-10-20 DIAGNOSIS — F41.9 ANXIETY: ICD-10-CM

## 2022-10-20 RX ORDER — BUPROPION HYDROCHLORIDE 150 MG/1
TABLET, EXTENDED RELEASE ORAL
Qty: 60 TABLET | Refills: 5 | Status: SHIPPED | OUTPATIENT
Start: 2022-10-20

## 2022-11-08 DIAGNOSIS — F90.2 ATTENTION DEFICIT HYPERACTIVITY DISORDER (ADHD), COMBINED TYPE: ICD-10-CM

## 2022-11-09 RX ORDER — DEXTROAMPHETAMINE SACCHARATE, AMPHETAMINE ASPARTATE, DEXTROAMPHETAMINE SULFATE AND AMPHETAMINE SULFATE 5; 5; 5; 5 MG/1; MG/1; MG/1; MG/1
20 TABLET ORAL
Qty: 60 TABLET | Refills: 0 | Status: SHIPPED | OUTPATIENT
Start: 2022-11-09

## 2022-12-14 DIAGNOSIS — F90.2 ATTENTION DEFICIT HYPERACTIVITY DISORDER (ADHD), COMBINED TYPE: ICD-10-CM

## 2022-12-17 RX ORDER — DEXTROAMPHETAMINE SACCHARATE, AMPHETAMINE ASPARTATE, DEXTROAMPHETAMINE SULFATE AND AMPHETAMINE SULFATE 5; 5; 5; 5 MG/1; MG/1; MG/1; MG/1
20 TABLET ORAL
Qty: 60 TABLET | Refills: 0 | Status: SHIPPED | OUTPATIENT
Start: 2022-12-17

## 2023-02-03 ENCOUNTER — TELEPHONE (OUTPATIENT)
Dept: BARIATRICS | Facility: CLINIC | Age: 43
End: 2023-02-03

## 2023-02-06 DIAGNOSIS — F90.2 ATTENTION DEFICIT HYPERACTIVITY DISORDER (ADHD), COMBINED TYPE: ICD-10-CM

## 2023-02-06 RX ORDER — DEXTROAMPHETAMINE SACCHARATE, AMPHETAMINE ASPARTATE, DEXTROAMPHETAMINE SULFATE AND AMPHETAMINE SULFATE 5; 5; 5; 5 MG/1; MG/1; MG/1; MG/1
20 TABLET ORAL
Qty: 60 TABLET | Refills: 0 | Status: SHIPPED | OUTPATIENT
Start: 2023-02-06 | End: 2023-02-09 | Stop reason: SDUPTHER

## 2023-02-06 NOTE — TELEPHONE ENCOUNTER
Patient would like adderall 20mg transferred to a new pharmacy  [facility] [phone]  Needs SENT ASAP BECAUSE THEY CAN'T HOLD IT FOR HER

## 2023-02-09 ENCOUNTER — OFFICE VISIT (OUTPATIENT)
Dept: FAMILY MEDICINE CLINIC | Facility: CLINIC | Age: 43
End: 2023-02-09

## 2023-02-09 VITALS — OXYGEN SATURATION: 99 % | DIASTOLIC BLOOD PRESSURE: 80 MMHG | HEART RATE: 94 BPM | SYSTOLIC BLOOD PRESSURE: 124 MMHG

## 2023-02-09 DIAGNOSIS — F41.9 ANXIETY: Primary | ICD-10-CM

## 2023-02-09 DIAGNOSIS — F90.2 ATTENTION DEFICIT HYPERACTIVITY DISORDER (ADHD), COMBINED TYPE: ICD-10-CM

## 2023-02-09 DIAGNOSIS — F33.9 DEPRESSION, RECURRENT (HCC): ICD-10-CM

## 2023-02-09 RX ORDER — DEXTROAMPHETAMINE SACCHARATE, AMPHETAMINE ASPARTATE, DEXTROAMPHETAMINE SULFATE AND AMPHETAMINE SULFATE 5; 5; 5; 5 MG/1; MG/1; MG/1; MG/1
20 TABLET ORAL
Qty: 60 TABLET | Refills: 0 | Status: SHIPPED | OUTPATIENT
Start: 2023-02-09

## 2023-02-09 RX ORDER — LORAZEPAM 1 MG/1
1 TABLET ORAL EVERY 8 HOURS PRN
Qty: 30 TABLET | Refills: 3 | Status: SHIPPED | OUTPATIENT
Start: 2023-02-09

## 2023-02-09 NOTE — PROGRESS NOTES
Assessment/Plan:    No problem-specific Assessment & Plan notes found for this encounter  Diagnoses and all orders for this visit:    Anxiety    Depression, recurrent ([address] )    Attention deficit hyperactivity disorder (ADHD), combined type  -     amphetamine-dextroamphetamine (ADDERALL) 20 mg tablet; Take 1 tablet (20 mg total) by mouth 2 (two) times a day Max Daily Amount: 40 mg          Subjective:   Chief Complaint   Patient presents with   • Follow-up     Med Mgmt        Patient ID: Neda Ortiz is a 40 y o  female  HPI    The following portions of the patient's history were reviewed and updated as appropriate: allergies, current medications, past family history, past medical history, past social history, past surgical history and problem list     Review of Systems   Constitutional: Negative for fatigue, fever and unexpected weight change  HENT: Negative for congestion, sinus pain and sore throat  Eyes: Negative for visual disturbance  Respiratory: Negative for shortness of breath and wheezing  Cardiovascular: Negative for chest pain and palpitations  Gastrointestinal: Negative for abdominal pain, nausea and vomiting  Musculoskeletal: Negative  Negative for arthralgias and myalgias  Neurological: Negative for syncope, weakness and numbness  Psychiatric/Behavioral: Negative  Negative for confusion, dysphoric mood and suicidal ideas  Objective:  Vitals:    02/09/23 0733   BP: 124/80   Pulse: 94   SpO2: 99%      Physical Exam  Constitutional:       Appearance: She is well-developed  HENT:      Right Ear: Ear canal normal  Tympanic membrane is not injected  Left Ear: Ear canal normal  Tympanic membrane is not injected  Nose: Nose normal    Eyes:      General:         Right eye: No discharge  Left eye: No discharge  Conjunctiva/sclera: Conjunctivae normal       Pupils: Pupils are equal, round, and reactive to light     Neck:      Thyroid: No thyromegaly  Cardiovascular:      Rate and Rhythm: Normal rate and regular rhythm  Heart sounds: Normal heart sounds  No murmur heard  Pulmonary:      Effort: Pulmonary effort is normal  No respiratory distress  Breath sounds: Normal breath sounds  No wheezing  Abdominal:      General: Bowel sounds are normal  There is no distension  Palpations: Abdomen is soft  Tenderness: There is no abdominal tenderness  Musculoskeletal:         General: Normal range of motion  Cervical back: Normal range of motion and neck supple  Lymphadenopathy:      Cervical: No cervical adenopathy  Skin:     General: Skin is warm and dry  Neurological:      Mental Status: She is alert and oriented to person, place, and time  She is not disoriented  Sensory: No sensory deficit  Gait: Gait normal       Deep Tendon Reflexes: Reflexes are normal and symmetric  Psychiatric:         Speech: Speech normal          Behavior: Behavior normal          Thought Content:  Thought content normal          Judgment: Judgment normal

## 2023-03-09 DIAGNOSIS — F90.2 ATTENTION DEFICIT HYPERACTIVITY DISORDER (ADHD), COMBINED TYPE: ICD-10-CM

## 2023-03-10 RX ORDER — DEXTROAMPHETAMINE SACCHARATE, AMPHETAMINE ASPARTATE, DEXTROAMPHETAMINE SULFATE AND AMPHETAMINE SULFATE 5; 5; 5; 5 MG/1; MG/1; MG/1; MG/1
20 TABLET ORAL
Qty: 60 TABLET | Refills: 0 | Status: SHIPPED | OUTPATIENT
Start: 2023-03-10

## 2023-05-11 DIAGNOSIS — F90.2 ATTENTION DEFICIT HYPERACTIVITY DISORDER (ADHD), COMBINED TYPE: ICD-10-CM

## 2023-05-15 RX ORDER — DEXTROAMPHETAMINE SACCHARATE, AMPHETAMINE ASPARTATE, DEXTROAMPHETAMINE SULFATE AND AMPHETAMINE SULFATE 5; 5; 5; 5 MG/1; MG/1; MG/1; MG/1
20 TABLET ORAL
Qty: 60 TABLET | Refills: 0 | Status: SHIPPED | OUTPATIENT
Start: 2023-05-15

## 2023-06-14 DIAGNOSIS — F90.2 ATTENTION DEFICIT HYPERACTIVITY DISORDER (ADHD), COMBINED TYPE: ICD-10-CM

## 2023-06-16 RX ORDER — DEXTROAMPHETAMINE SACCHARATE, AMPHETAMINE ASPARTATE, DEXTROAMPHETAMINE SULFATE AND AMPHETAMINE SULFATE 5; 5; 5; 5 MG/1; MG/1; MG/1; MG/1
20 TABLET ORAL
Qty: 60 TABLET | Refills: 0 | Status: SHIPPED | OUTPATIENT
Start: 2023-06-16

## 2023-07-17 DIAGNOSIS — F90.2 ATTENTION DEFICIT HYPERACTIVITY DISORDER (ADHD), COMBINED TYPE: ICD-10-CM

## 2023-07-17 RX ORDER — DEXTROAMPHETAMINE SACCHARATE, AMPHETAMINE ASPARTATE, DEXTROAMPHETAMINE SULFATE AND AMPHETAMINE SULFATE 5; 5; 5; 5 MG/1; MG/1; MG/1; MG/1
20 TABLET ORAL
Qty: 60 TABLET | Refills: 0 | OUTPATIENT
Start: 2023-07-17

## 2023-07-24 DIAGNOSIS — F90.2 ATTENTION DEFICIT HYPERACTIVITY DISORDER (ADHD), COMBINED TYPE: ICD-10-CM

## 2023-07-25 RX ORDER — DEXTROAMPHETAMINE SACCHARATE, AMPHETAMINE ASPARTATE, DEXTROAMPHETAMINE SULFATE AND AMPHETAMINE SULFATE 5; 5; 5; 5 MG/1; MG/1; MG/1; MG/1
20 TABLET ORAL
Qty: 60 TABLET | Refills: 0 | Status: SHIPPED | OUTPATIENT
Start: 2023-07-25

## 2023-08-26 DIAGNOSIS — F90.2 ATTENTION DEFICIT HYPERACTIVITY DISORDER (ADHD), COMBINED TYPE: ICD-10-CM

## 2023-08-26 RX ORDER — DEXTROAMPHETAMINE SACCHARATE, AMPHETAMINE ASPARTATE, DEXTROAMPHETAMINE SULFATE AND AMPHETAMINE SULFATE 5; 5; 5; 5 MG/1; MG/1; MG/1; MG/1
20 TABLET ORAL
Qty: 60 TABLET | Refills: 0 | Status: CANCELLED | OUTPATIENT
Start: 2023-08-26

## 2023-08-29 DIAGNOSIS — F90.2 ATTENTION DEFICIT HYPERACTIVITY DISORDER (ADHD), COMBINED TYPE: ICD-10-CM

## 2023-08-29 RX ORDER — DEXTROAMPHETAMINE SACCHARATE, AMPHETAMINE ASPARTATE, DEXTROAMPHETAMINE SULFATE AND AMPHETAMINE SULFATE 5; 5; 5; 5 MG/1; MG/1; MG/1; MG/1
20 TABLET ORAL
Qty: 60 TABLET | Refills: 0 | Status: SHIPPED | OUTPATIENT
Start: 2023-08-29

## 2023-08-29 NOTE — TELEPHONE ENCOUNTER
Pt would like this prescription refilled and sent to [facility] in [address] [place] [address].

## 2023-09-13 DIAGNOSIS — F41.9 ANXIETY: ICD-10-CM

## 2023-09-14 RX ORDER — BUPROPION HYDROCHLORIDE 150 MG/1
TABLET, EXTENDED RELEASE ORAL
Qty: 60 TABLET | Refills: 5 | Status: SHIPPED | OUTPATIENT
Start: 2023-09-14

## 2023-09-22 ENCOUNTER — OFFICE VISIT (OUTPATIENT)
Dept: FAMILY MEDICINE CLINIC | Facility: CLINIC | Age: 43
End: 2023-09-22
Payer: COMMERCIAL

## 2023-09-22 VITALS
HEART RATE: 90 BPM | HEIGHT: 64 IN | OXYGEN SATURATION: 99 % | SYSTOLIC BLOOD PRESSURE: 122 MMHG | DIASTOLIC BLOOD PRESSURE: 74 MMHG | WEIGHT: 172 LBS | TEMPERATURE: 97.1 F | BODY MASS INDEX: 29.37 KG/M2

## 2023-09-22 DIAGNOSIS — Z00.00 WELLNESS EXAMINATION: Primary | ICD-10-CM

## 2023-09-22 DIAGNOSIS — F41.9 ANXIETY: ICD-10-CM

## 2023-09-22 DIAGNOSIS — F90.2 ATTENTION DEFICIT HYPERACTIVITY DISORDER (ADHD), COMBINED TYPE: ICD-10-CM

## 2023-09-22 DIAGNOSIS — E78.2 MIXED HYPERLIPIDEMIA: ICD-10-CM

## 2023-09-22 PROCEDURE — 99214 OFFICE O/P EST MOD 30 MIN: CPT | Performed by: FAMILY MEDICINE

## 2023-09-22 PROCEDURE — 99396 PREV VISIT EST AGE 40-64: CPT | Performed by: FAMILY MEDICINE

## 2023-09-22 NOTE — PROGRESS NOTES
HPI:  Erinn Hanks is a 44 y.o. female here for her yearly health maintenance exam.   Patient Active Problem List   Diagnosis   • Depression   • Preop cardiovascular exam   • Bariatric surgery status   • Postsurgical malabsorption   • Sebaceous cyst   • Right upper quadrant pain   • Obesity, Class I, BMI 30-34.9   • Epigastric pain   • Encounter for surgical aftercare following surgery of digestive system   • Cholecystitis   • Anxiety   • Depression, recurrent ([address])   • Attention deficit hyperactivity disorder (ADHD), combined type     Past Medical History:   Diagnosis Date   • Anxiety    • Cholelithiasis     lap ines today 1/25/2021   • Depression    • Morbid obesity ([address])     RUEN-Y,surgery 9/2019- wt loss 130 lbs   • Obesity    • Ovarian cyst     surgicallly removed   • PONV (postoperative nausea and vomiting)     "severely" after RUEN-Y   • Seasonal allergies    • Wears contact lenses        1. Advanced Directive: N     2. Durable Power of  for Healthcare: N     3. Social History:           Drug and alcohol History: N                  4. Immunizations up to date: Y                 Lifestyle:                           Healthy Diet: Y                          Alcohol Use:N                          Tobacco Use:N                          Regular exercise: Y                          Weight concerns:N                               5.  Over the past 2 weeks, how often have you been bothered by the following:              Little interest or pleasure in doing things:N              Felling down, depressed or hopeless:N       Current Outpatient Medications   Medication Sig Dispense Refill   • acetaminophen (TYLENOL) 325 mg tablet Take 3 tablets (975 mg total) by mouth every 8 (eight) hours 30 tablet 0   • amphetamine-dextroamphetamine (ADDERALL) 20 mg tablet Take 1 tablet (20 mg total) by mouth 2 (two) times a day Max Daily Amount: 40 mg 60 tablet 0   • buPROPion (WELLBUTRIN SR) 150 mg 12 hr tablet TAKE ONE TABLET BY MOUTH TWICE A DAY 60 tablet 5   • etonogestrel-ethinyl estradiol (NUVARING) 0.12-0.015 MG/24HR vaginal ring Insert 1 each into the vagina every 28 days Insert vaginally and leave in place for 3 consecutive weeks, then remove for 1 week. • LORazepam (ATIVAN) 1 mg tablet Take 1 tablet (1 mg total) by mouth every 8 (eight) hours as needed for anxiety 30 tablet 5   • calcium citrate-vitamin d (CELEBRATE CALCIUM CITRATE) 500-500 MG-UNIT CHEW chewable tablet  (Patient not taking: Reported on 4/27/2022 )     • Multiple Vitamins-Minerals (BARIATRIC FUSION) CHEW        No current facility-administered medications for this visit. Allergies   Allergen Reactions   • Sulfa Antibiotics Hives     Immunization History   Administered Date(s) Administered   • COVID-19 PFIZER VACCINE 0.3 ML IM 12/31/2020, 01/21/2021, 11/12/2021   • INFLUENZA 10/19/2018, 10/25/2019, 09/29/2020   • Influenza Injectable, MDCK, Preservative Free, Quadrivalent, 0.5 mL 10/19/2018   • Influenza Quadrivalent Preservative Free 3 years and older IM 09/29/2020, 10/06/2021   • influenza, injectable, quadrivalent 10/25/2019       Patient Care Team:  Quinton Fine MD as PCP - General (Family Medicine)    Review of Systems   Constitutional: Negative for fatigue, fever and unexpected weight change. HENT: Negative for congestion, sinus pain and sore throat. Eyes: Negative for visual disturbance. Respiratory: Negative for shortness of breath and wheezing. Cardiovascular: Negative for chest pain and palpitations. Gastrointestinal: Negative for abdominal pain, nausea and vomiting. Musculoskeletal: Negative. Negative for arthralgias and myalgias. Neurological: Negative for syncope, weakness and numbness. Psychiatric/Behavioral: Negative. Negative for confusion, dysphoric mood and suicidal ideas. Physical Exam :  Physical Exam  Constitutional:       General: She is not in acute distress. Appearance: She is well-developed. She is not diaphoretic. HENT:      Right Ear: Tympanic membrane, ear canal and external ear normal. Tympanic membrane is not injected. Left Ear: Tympanic membrane, ear canal and external ear normal. Tympanic membrane is not injected. Nose: Nose normal.   Eyes:      General: Lids are normal.      Conjunctiva/sclera: Conjunctivae normal.      Pupils: Pupils are equal, round, and reactive to light. Neck:      Thyroid: No thyromegaly. Cardiovascular:      Rate and Rhythm: Normal rate and regular rhythm. Heart sounds: Normal heart sounds. No murmur heard. Pulmonary:      Effort: Pulmonary effort is normal. No respiratory distress. Breath sounds: Normal breath sounds. No wheezing. Abdominal:      General: Bowel sounds are normal.      Palpations: Abdomen is soft. There is no hepatomegaly or splenomegaly. Tenderness: There is no abdominal tenderness. Musculoskeletal:         General: No tenderness or deformity. Normal range of motion. Cervical back: Normal range of motion and neck supple. Skin:     General: Skin is warm and dry. Coloration: Skin is not pale. Findings: No rash. Neurological:      Mental Status: She is alert and oriented to person, place, and time. She is not disoriented. Sensory: No sensory deficit. Gait: Gait normal.      Deep Tendon Reflexes: Reflexes are normal and symmetric. Psychiatric:         Speech: Speech normal.         Behavior: Behavior normal.           Assessment and Plan:  1. Wellness examination        2. Anxiety        3. Attention deficit hyperactivity disorder (ADHD), combined type        4.  Mixed hyperlipidemia  CBC and differential    Comprehensive metabolic panel    Lipid Panel with Direct LDL reflex    CBC and differential    Comprehensive metabolic panel    Lipid Panel with Direct LDL reflex          Health Maintenance Due   Topic Date Due   • Hepatitis C Screening  Never done   • HIV Screening  Never done   • BMI: Adult  Never done   • DTaP,Tdap,and Td Vaccines (1 - Tdap) Never done   • COVID-19 Vaccine (4 - Pfizer series) 01/07/2022   • Breast Cancer Screening: Mammogram  05/25/2023   • Cervical Cancer Screening  08/16/2023   • Influenza Vaccine (1) 09/01/2023

## 2023-09-22 NOTE — PROGRESS NOTES
Assessment/Plan:    No problem-specific Assessment & Plan notes found for this encounter. Diagnoses and all orders for this visit:    Wellness examination    Anxiety    Attention deficit hyperactivity disorder (ADHD), combined type    Mixed hyperlipidemia  -     CBC and differential; Future  -     Comprehensive metabolic panel; Future  -     Lipid Panel with Direct LDL reflex; Future  -     CBC and differential  -     Comprehensive metabolic panel  -     Lipid Panel with Direct LDL reflex          Subjective:   Chief Complaint   Patient presents with   • Physical Exam        Patient ID: Annamarie Manning is a 44 y.o. female. HPI    The following portions of the patient's history were reviewed and updated as appropriate: allergies, current medications, past family history, past medical history, past social history, past surgical history and problem list.    Review of Systems   Constitutional: Negative for appetite change and fatigue. HENT: Negative for ear pain, postnasal drip, sinus pressure and sore throat. Eyes: Negative for redness and visual disturbance. Respiratory: Negative for cough, chest tightness, shortness of breath and wheezing. Cardiovascular: Negative for chest pain, palpitations and leg swelling. Gastrointestinal: Negative for abdominal pain, blood in stool, constipation, diarrhea, nausea and vomiting. Genitourinary: Negative for difficulty urinating, flank pain, hematuria and urgency. Musculoskeletal: Negative for arthralgias, back pain, joint swelling and myalgias. Skin: Negative for rash and wound. No suspicious skin lesions   Neurological: Negative for light-headedness, numbness and headaches. Psychiatric/Behavioral: Negative for confusion, decreased concentration, sleep disturbance and suicidal ideas. The patient is not nervous/anxious.           Objective:  Vitals:    09/22/23 0923   BP: 122/74   BP Location: Left arm   Patient Position: Sitting   Cuff Size: Standard   Pulse: 90   Temp: (!) 97.1 °F (36.2 °C)   TempSrc: Tympanic   SpO2: 99%   Weight: 78 kg (172 lb)   Height: 5' 4" (1.626 m)      Physical Exam  Constitutional:       General: She is not in acute distress. Appearance: She is well-developed. She is not diaphoretic. HENT:      Right Ear: Tympanic membrane, ear canal and external ear normal. Tympanic membrane is not injected. Left Ear: Tympanic membrane, ear canal and external ear normal. Tympanic membrane is not injected. Nose: Nose normal.   Eyes:      General: Lids are normal.      Conjunctiva/sclera: Conjunctivae normal.      Pupils: Pupils are equal, round, and reactive to light. Neck:      Thyroid: No thyromegaly. Cardiovascular:      Rate and Rhythm: Normal rate and regular rhythm. Heart sounds: Normal heart sounds. No murmur heard. Pulmonary:      Effort: Pulmonary effort is normal. No respiratory distress. Breath sounds: Normal breath sounds. No wheezing. Abdominal:      General: Bowel sounds are normal.      Palpations: Abdomen is soft. There is no hepatomegaly or splenomegaly. Tenderness: There is no abdominal tenderness. Musculoskeletal:         General: No tenderness or deformity. Normal range of motion. Cervical back: Normal range of motion and neck supple. Skin:     General: Skin is warm and dry. Coloration: Skin is not pale. Findings: No rash. Neurological:      Mental Status: She is alert and oriented to person, place, and time. She is not disoriented. Sensory: No sensory deficit. Gait: Gait normal.      Deep Tendon Reflexes: Reflexes are normal and symmetric.    Psychiatric:         Speech: Speech normal.         Behavior: Behavior normal.

## 2023-10-04 DIAGNOSIS — F90.2 ATTENTION DEFICIT HYPERACTIVITY DISORDER (ADHD), COMBINED TYPE: ICD-10-CM

## 2023-10-04 RX ORDER — DEXTROAMPHETAMINE SACCHARATE, AMPHETAMINE ASPARTATE, DEXTROAMPHETAMINE SULFATE AND AMPHETAMINE SULFATE 5; 5; 5; 5 MG/1; MG/1; MG/1; MG/1
20 TABLET ORAL
Qty: 60 TABLET | Refills: 0 | Status: SHIPPED | OUTPATIENT
Start: 2023-10-04

## 2023-10-14 LAB
ALBUMIN SERPL-MCNC: 4.3 G/DL (ref 3.9–4.9)
ALBUMIN/GLOB SERPL: 2 {RATIO} (ref 1.2–2.2)
ALP SERPL-CCNC: 44 IU/L (ref 44–121)
ALT SERPL-CCNC: 22 IU/L (ref 0–32)
AST SERPL-CCNC: 23 IU/L (ref 0–40)
BASOPHILS # BLD AUTO: 0.1 X10E3/UL (ref 0–0.2)
BASOPHILS NFR BLD AUTO: 1 %
BILIRUB SERPL-MCNC: 0.4 MG/DL (ref 0–1.2)
BUN SERPL-MCNC: 8 MG/DL (ref 6–24)
BUN/CREAT SERPL: 10 (ref 9–23)
CALCIUM SERPL-MCNC: 9.4 MG/DL (ref 8.7–10.2)
CHLORIDE SERPL-SCNC: 102 MMOL/L (ref 96–106)
CHOLEST SERPL-MCNC: 152 MG/DL (ref 100–199)
CO2 SERPL-SCNC: 24 MMOL/L (ref 20–29)
CREAT SERPL-MCNC: 0.77 MG/DL (ref 0.57–1)
EGFR: 99 ML/MIN/1.73
EOSINOPHIL # BLD AUTO: 0.3 X10E3/UL (ref 0–0.4)
EOSINOPHIL NFR BLD AUTO: 6 %
ERYTHROCYTE [DISTWIDTH] IN BLOOD BY AUTOMATED COUNT: 11.8 % (ref 11.7–15.4)
GLOBULIN SER-MCNC: 2.2 G/DL (ref 1.5–4.5)
GLUCOSE SERPL-MCNC: 91 MG/DL (ref 70–99)
HCT VFR BLD AUTO: 38.7 % (ref 34–46.6)
HDLC SERPL-MCNC: 77 MG/DL
HGB BLD-MCNC: 12.8 G/DL (ref 11.1–15.9)
IMM GRANULOCYTES # BLD: 0 X10E3/UL (ref 0–0.1)
IMM GRANULOCYTES NFR BLD: 0 %
LDLC SERPL CALC-MCNC: 63 MG/DL (ref 0–99)
LDLC/HDLC SERPL: 0.8 RATIO (ref 0–3.2)
LYMPHOCYTES # BLD AUTO: 1.7 X10E3/UL (ref 0.7–3.1)
LYMPHOCYTES NFR BLD AUTO: 30 %
MCH RBC QN AUTO: 31.5 PG (ref 26.6–33)
MCHC RBC AUTO-ENTMCNC: 33.1 G/DL (ref 31.5–35.7)
MCV RBC AUTO: 95 FL (ref 79–97)
MONOCYTES # BLD AUTO: 0.3 X10E3/UL (ref 0.1–0.9)
MONOCYTES NFR BLD AUTO: 5 %
NEUTROPHILS # BLD AUTO: 3.3 X10E3/UL (ref 1.4–7)
NEUTROPHILS NFR BLD AUTO: 58 %
PLATELET # BLD AUTO: 269 X10E3/UL (ref 150–450)
POTASSIUM SERPL-SCNC: 4.3 MMOL/L (ref 3.5–5.2)
PROT SERPL-MCNC: 6.5 G/DL (ref 6–8.5)
RBC # BLD AUTO: 4.06 X10E6/UL (ref 3.77–5.28)
SL AMB VLDL CHOLESTEROL CALC: 12 MG/DL (ref 5–40)
SODIUM SERPL-SCNC: 139 MMOL/L (ref 134–144)
TRIGL SERPL-MCNC: 56 MG/DL (ref 0–149)
WBC # BLD AUTO: 5.7 X10E3/UL (ref 3.4–10.8)

## 2023-10-16 ENCOUNTER — TELEPHONE (OUTPATIENT)
Dept: FAMILY MEDICINE CLINIC | Facility: CLINIC | Age: 43
End: 2023-10-16

## 2023-10-16 NOTE — TELEPHONE ENCOUNTER
----- Message from Julian Lucas MD sent at 10/14/2023  8:32 AM EDT -----  Labs good---PE annually  ----- Message -----  From: Tyler Donahue Lab Results In  Sent: 10/14/2023   6:06 AM EDT  To: Julian Lucas MD

## 2023-11-06 DIAGNOSIS — F90.2 ATTENTION DEFICIT HYPERACTIVITY DISORDER (ADHD), COMBINED TYPE: ICD-10-CM

## 2023-11-08 RX ORDER — DEXTROAMPHETAMINE SACCHARATE, AMPHETAMINE ASPARTATE, DEXTROAMPHETAMINE SULFATE AND AMPHETAMINE SULFATE 5; 5; 5; 5 MG/1; MG/1; MG/1; MG/1
20 TABLET ORAL
Qty: 60 TABLET | Refills: 0 | Status: SHIPPED | OUTPATIENT
Start: 2023-11-08

## 2023-12-11 DIAGNOSIS — F90.2 ATTENTION DEFICIT HYPERACTIVITY DISORDER (ADHD), COMBINED TYPE: ICD-10-CM

## 2023-12-11 RX ORDER — DEXTROAMPHETAMINE SACCHARATE, AMPHETAMINE ASPARTATE, DEXTROAMPHETAMINE SULFATE AND AMPHETAMINE SULFATE 5; 5; 5; 5 MG/1; MG/1; MG/1; MG/1
20 TABLET ORAL
Qty: 60 TABLET | Refills: 0 | Status: SHIPPED | OUTPATIENT
Start: 2023-12-11

## 2024-01-20 DIAGNOSIS — F90.2 ATTENTION DEFICIT HYPERACTIVITY DISORDER (ADHD), COMBINED TYPE: ICD-10-CM

## 2024-01-23 RX ORDER — DEXTROAMPHETAMINE SACCHARATE, AMPHETAMINE ASPARTATE, DEXTROAMPHETAMINE SULFATE AND AMPHETAMINE SULFATE 5; 5; 5; 5 MG/1; MG/1; MG/1; MG/1
20 TABLET ORAL
Qty: 60 TABLET | Refills: 0 | Status: SHIPPED | OUTPATIENT
Start: 2024-01-23

## 2024-02-24 DIAGNOSIS — F90.2 ATTENTION DEFICIT HYPERACTIVITY DISORDER (ADHD), COMBINED TYPE: ICD-10-CM

## 2024-02-26 RX ORDER — DEXTROAMPHETAMINE SACCHARATE, AMPHETAMINE ASPARTATE, DEXTROAMPHETAMINE SULFATE AND AMPHETAMINE SULFATE 5; 5; 5; 5 MG/1; MG/1; MG/1; MG/1
20 TABLET ORAL
Qty: 60 TABLET | Refills: 0 | Status: SHIPPED | OUTPATIENT
Start: 2024-02-26

## 2024-03-12 ENCOUNTER — TELEPHONE (OUTPATIENT)
Dept: FAMILY MEDICINE CLINIC | Facility: CLINIC | Age: 44
End: 2024-03-12

## 2024-03-12 ENCOUNTER — TELEMEDICINE (OUTPATIENT)
Dept: FAMILY MEDICINE CLINIC | Facility: CLINIC | Age: 44
End: 2024-03-12
Payer: COMMERCIAL

## 2024-03-12 DIAGNOSIS — F90.2 ATTENTION DEFICIT HYPERACTIVITY DISORDER (ADHD), COMBINED TYPE: ICD-10-CM

## 2024-03-12 DIAGNOSIS — F41.9 ANXIETY: Primary | ICD-10-CM

## 2024-03-12 PROCEDURE — 99213 OFFICE O/P EST LOW 20 MIN: CPT | Performed by: FAMILY MEDICINE

## 2024-03-12 RX ORDER — CALCIUM CARBONATE/VITAMIN D3 600 MG-10
TABLET ORAL
COMMUNITY
Start: 2023-03-01

## 2024-03-12 NOTE — PROGRESS NOTES
Virtual Regular Visit    Verification of patient location:    Patient is located at Home in the following state in which I hold an active license [place]      Assessment/Plan:    Problem List Items Addressed This Visit       Anxiety - Primary     Pt would like to restart trintellix---taper off wellbutrin over 6 days---start trintellix         Relevant Medications    vortioxetine (Trintellix) 10 MG tablet    Attention deficit hyperactivity disorder (ADHD), combined type    Relevant Medications    vortioxetine (Trintellix) 10 MG tablet            Reason for visit is   Chief Complaint   Patient presents with    Follow-up     Med check        Encounter provider Cj San MD    Provider located at [address] [id number]      Recent Visits  No visits were found meeting these conditions.  Showing recent visits within past 7 days and meeting all other requirements  Today's Visits  Date Type Provider Dept   03/12/24 Telephone Skyla Monreal [facility]   03/12/24 Telemedicine Cj San MD [facility]   Showing today's visits and meeting all other requirements  Future Appointments  No visits were found meeting these conditions.  Showing future appointments within next 150 days and meeting all other requirements       The patient was identified by name and date of birth. Pee Stokeschison was informed that this is a telemedicine visit and that the visit is being conducted through the Epic Embedded platform. She agrees to proceed..  My office door was closed. No one else was in the room.  She acknowledged consent and understanding of privacy and security of the video platform. The patient has agreed to participate and understands they can discontinue the visit at any time.    Patient is aware this is a billable service.     Subjective  Pee KENDRICK Bill is a 43 y.o. female recheck on current meds  ".      HPI     Past Medical History:   Diagnosis Date    Anxiety     Cholelithiasis     lap ines today 1/25/2021    Depression     Morbid obesity (HCC)     RUEN-Y,surgery 9/2019- wt loss 130 lbs    Obesity     Ovarian cyst     surgicallly removed    PONV (postoperative nausea and vomiting)     \"severely\" after RUEN-Y    Seasonal allergies     Wears contact lenses        Past Surgical History:   Procedure Laterality Date    CHOLECYSTECTOMY  1/25/2021    CHOLECYSTECTOMY LAPAROSCOPIC N/A 1/25/2021    Procedure: LAPAROSCOPIC CHOLECYSTECTOMY;  Surgeon: Sonny White MD;  Location: [facility];  Service: Bariatrics    EGD      OVARIAN CYST REMOVAL      MT LAPS GSTR RSTCV PX W/BYP YUNIOR-EN-Y LIMB <150 CM N/A 9/23/2019    Procedure: LAPAROSCOPIC YUNIOR-EN-Y GASTRIC BYPASS AND INTRAOPERATIVE EGD;  Surgeon: Sonny White MD;  Location: [facility];  Service: Bariatrics    WISDOM TOOTH EXTRACTION         Current Outpatient Medications   Medication Sig Dispense Refill    acetaminophen (TYLENOL) 325 mg tablet Take 3 tablets (975 mg total) by mouth every 8 (eight) hours 30 tablet 0    amphetamine-dextroamphetamine (ADDERALL) 20 mg tablet Take 1 tablet (20 mg total) by mouth 2 (two) times a day Max Daily Amount: 40 mg 60 tablet 0    B Complex Vitamins (B COMPLEX 1 PO)       buPROPion (WELLBUTRIN SR) 150 mg 12 hr tablet TAKE ONE TABLET BY MOUTH TWICE A DAY 60 tablet 5    Calcium Carb-Cholecalciferol (Calcium 600-D) 600-10 MG-MCG TABS       etonogestrel-ethinyl estradiol (NUVARING) 0.12-0.015 MG/24HR vaginal ring Insert 1 each into the vagina every 28 days Insert vaginally and leave in place for 3 consecutive weeks, then remove for 1 week.      LORazepam (ATIVAN) 1 mg tablet Take 1 tablet (1 mg total) by mouth every 8 (eight) hours as needed for anxiety 30 tablet 5    Multiple Vitamins-Minerals (BARIATRIC FUSION PO)       vortioxetine (Trintellix) 10 MG tablet Take 1 tablet (10 mg total) by mouth daily 90 tablet 3     No current " facility-administered medications for this visit.        Allergies   Allergen Reactions    Sulfa Antibiotics Hives       Review of Systems   Constitutional:  Negative for fatigue, fever and unexpected weight change.   HENT:  Negative for congestion, sinus pain and sore throat.    Eyes:  Negative for visual disturbance.   Respiratory:  Negative for shortness of breath and wheezing.    Cardiovascular:  Negative for chest pain and palpitations.   Gastrointestinal:  Negative for abdominal pain, nausea and vomiting.   Musculoskeletal: Negative.  Negative for arthralgias and myalgias.   Neurological:  Negative for syncope, weakness and numbness.   Psychiatric/Behavioral:  Positive for dysphoric mood. Negative for confusion and suicidal ideas. The patient is nervous/anxious.        Video Exam    There were no vitals filed for this visit.    Physical Exam  Constitutional:       Appearance: Normal appearance.   Pulmonary:      Effort: Pulmonary effort is normal.   Neurological:      General: No focal deficit present.      Mental Status: She is alert.          Visit Time  Total Visit Duration: 15

## 2024-03-23 DIAGNOSIS — F41.9 ANXIETY: ICD-10-CM

## 2024-03-25 RX ORDER — BUPROPION HYDROCHLORIDE 150 MG/1
TABLET, EXTENDED RELEASE ORAL
Qty: 60 TABLET | Refills: 2 | Status: SHIPPED | OUTPATIENT
Start: 2024-03-25

## 2024-04-01 DIAGNOSIS — F90.2 ATTENTION DEFICIT HYPERACTIVITY DISORDER (ADHD), COMBINED TYPE: ICD-10-CM

## 2024-04-02 RX ORDER — DEXTROAMPHETAMINE SACCHARATE, AMPHETAMINE ASPARTATE, DEXTROAMPHETAMINE SULFATE AND AMPHETAMINE SULFATE 5; 5; 5; 5 MG/1; MG/1; MG/1; MG/1
20 TABLET ORAL
Qty: 60 TABLET | Refills: 0 | Status: SHIPPED | OUTPATIENT
Start: 2024-04-02

## 2024-04-09 DIAGNOSIS — F41.9 ANXIETY: ICD-10-CM

## 2024-05-05 DIAGNOSIS — F90.2 ATTENTION DEFICIT HYPERACTIVITY DISORDER (ADHD), COMBINED TYPE: ICD-10-CM

## 2024-05-06 RX ORDER — DEXTROAMPHETAMINE SACCHARATE, AMPHETAMINE ASPARTATE, DEXTROAMPHETAMINE SULFATE AND AMPHETAMINE SULFATE 5; 5; 5; 5 MG/1; MG/1; MG/1; MG/1
20 TABLET ORAL
Qty: 60 TABLET | Refills: 0 | Status: SHIPPED | OUTPATIENT
Start: 2024-05-06

## 2024-06-10 DIAGNOSIS — F90.2 ATTENTION DEFICIT HYPERACTIVITY DISORDER (ADHD), COMBINED TYPE: ICD-10-CM

## 2024-06-11 RX ORDER — DEXTROAMPHETAMINE SACCHARATE, AMPHETAMINE ASPARTATE, DEXTROAMPHETAMINE SULFATE AND AMPHETAMINE SULFATE 5; 5; 5; 5 MG/1; MG/1; MG/1; MG/1
20 TABLET ORAL
Qty: 60 TABLET | Refills: 0 | Status: SHIPPED | OUTPATIENT
Start: 2024-06-11

## 2024-06-17 DIAGNOSIS — Z00.6 ENCOUNTER FOR EXAMINATION FOR NORMAL COMPARISON OR CONTROL IN CLINICAL RESEARCH PROGRAM: ICD-10-CM

## 2024-07-11 DIAGNOSIS — F90.2 ATTENTION DEFICIT HYPERACTIVITY DISORDER (ADHD), COMBINED TYPE: ICD-10-CM

## 2024-07-12 RX ORDER — DEXTROAMPHETAMINE SACCHARATE, AMPHETAMINE ASPARTATE, DEXTROAMPHETAMINE SULFATE AND AMPHETAMINE SULFATE 5; 5; 5; 5 MG/1; MG/1; MG/1; MG/1
20 TABLET ORAL
Qty: 60 TABLET | Refills: 0 | Status: SHIPPED | OUTPATIENT
Start: 2024-07-12

## 2024-08-19 DIAGNOSIS — F90.2 ATTENTION DEFICIT HYPERACTIVITY DISORDER (ADHD), COMBINED TYPE: ICD-10-CM

## 2024-08-20 RX ORDER — DEXTROAMPHETAMINE SACCHARATE, AMPHETAMINE ASPARTATE, DEXTROAMPHETAMINE SULFATE AND AMPHETAMINE SULFATE 5; 5; 5; 5 MG/1; MG/1; MG/1; MG/1
20 TABLET ORAL
Qty: 60 TABLET | Refills: 0 | Status: SHIPPED | OUTPATIENT
Start: 2024-08-20

## 2024-09-23 DIAGNOSIS — F90.2 ATTENTION DEFICIT HYPERACTIVITY DISORDER (ADHD), COMBINED TYPE: ICD-10-CM

## 2024-09-24 RX ORDER — DEXTROAMPHETAMINE SACCHARATE, AMPHETAMINE ASPARTATE, DEXTROAMPHETAMINE SULFATE AND AMPHETAMINE SULFATE 5; 5; 5; 5 MG/1; MG/1; MG/1; MG/1
20 TABLET ORAL
Qty: 60 TABLET | Refills: 0 | Status: SHIPPED | OUTPATIENT
Start: 2024-09-24

## 2024-09-25 ENCOUNTER — TELEPHONE (OUTPATIENT)
Dept: FAMILY MEDICINE CLINIC | Facility: CLINIC | Age: 44
End: 2024-09-25

## 2024-09-26 ENCOUNTER — OFFICE VISIT (OUTPATIENT)
Dept: FAMILY MEDICINE CLINIC | Facility: CLINIC | Age: 44
End: 2024-09-26
Payer: COMMERCIAL

## 2024-09-26 VITALS
BODY MASS INDEX: 30.49 KG/M2 | SYSTOLIC BLOOD PRESSURE: 128 MMHG | WEIGHT: 183 LBS | HEART RATE: 100 BPM | OXYGEN SATURATION: 100 % | DIASTOLIC BLOOD PRESSURE: 86 MMHG | HEIGHT: 65 IN

## 2024-09-26 DIAGNOSIS — E78.2 MIXED HYPERLIPIDEMIA: ICD-10-CM

## 2024-09-26 DIAGNOSIS — E55.9 VITAMIN D DEFICIENCY: ICD-10-CM

## 2024-09-26 DIAGNOSIS — F41.9 ANXIETY: ICD-10-CM

## 2024-09-26 DIAGNOSIS — Z98.84 BARIATRIC SURGERY STATUS: ICD-10-CM

## 2024-09-26 DIAGNOSIS — E53.8 B12 DEFICIENCY: ICD-10-CM

## 2024-09-26 DIAGNOSIS — F90.2 ATTENTION DEFICIT HYPERACTIVITY DISORDER (ADHD), COMBINED TYPE: ICD-10-CM

## 2024-09-26 DIAGNOSIS — Z12.31 SCREENING MAMMOGRAM FOR BREAST CANCER: Primary | ICD-10-CM

## 2024-09-26 DIAGNOSIS — Z00.00 WELLNESS EXAMINATION: ICD-10-CM

## 2024-09-26 PROCEDURE — 99396 PREV VISIT EST AGE 40-64: CPT | Performed by: FAMILY MEDICINE

## 2024-09-26 PROCEDURE — 99214 OFFICE O/P EST MOD 30 MIN: CPT | Performed by: FAMILY MEDICINE

## 2024-09-26 RX ORDER — CYANOCOBALAMIN (VITAMIN B-12) 500 MCG
TABLET ORAL
COMMUNITY
Start: 2023-01-01

## 2024-09-26 RX ORDER — THIAMINE HCL 100 MG
TABLET ORAL
COMMUNITY
Start: 2023-01-01

## 2024-09-26 RX ORDER — BUTYROSPERMUM PARKII(SHEA BUTTER), SIMMONDSIA CHINENSIS (JOJOBA) SEED OIL, ALOE BARBADENSIS LEAF EXTRACT .01; 1; 3.5 G/100G; G/100G; G/100G
LIQUID TOPICAL
COMMUNITY
Start: 2023-01-01

## 2024-09-26 NOTE — ASSESSMENT & PLAN NOTE
Orders:    CBC and differential; Future    Comprehensive metabolic panel; Future    Lipid Panel with Direct LDL reflex; Future    Vitamin B12; Future    Vitamin D 25 hydroxy; Future    Ferritin; Future    CBC and differential    Comprehensive metabolic panel    Lipid Panel with Direct LDL reflex    Vitamin B12    Vitamin D 25 hydroxy    Ferritin

## 2024-09-26 NOTE — PROGRESS NOTES
Adult Annual Physical  Name: Pee Khan      : 1980      MRN: [id number]  Encounter Provider: Cj San MD  Encounter Date: 2024   Encounter department: [facility]    Assessment & Plan  Screening mammogram for breast cancer    Orders:    Mammo screening bilateral w 3d and cad; Future    Anxiety         Attention deficit hyperactivity disorder (ADHD), combined type         Bariatric surgery status    Orders:    CBC and differential; Future    Comprehensive metabolic panel; Future    Lipid Panel with Direct LDL reflex; Future    Vitamin B12; Future    Vitamin D 25 hydroxy; Future    Ferritin; Future    CBC and differential    Comprehensive metabolic panel    Lipid Panel with Direct LDL reflex    Vitamin B12    Vitamin D 25 hydroxy    Ferritin    Wellness examination         Mixed hyperlipidemia    Orders:    CBC and differential; Future    Comprehensive metabolic panel; Future    Lipid Panel with Direct LDL reflex; Future    Vitamin B12; Future    Vitamin D 25 hydroxy; Future    Ferritin; Future    CBC and differential    Comprehensive metabolic panel    Lipid Panel with Direct LDL reflex    Vitamin B12    Vitamin D 25 hydroxy    Ferritin    Vitamin D deficiency    Orders:    Vitamin D 25 hydroxy; Future    Vitamin D 25 hydroxy    B12 deficiency    Orders:    Vitamin B12; Future    Vitamin B12    Immunizations and preventive care screenings were discussed with patient today. Appropriate education was printed on patient's after visit summary.    Counseling:  Alcohol/drug use: discussed moderation in alcohol intake, the recommendations for healthy alcohol use, and avoidance of illicit drug use.      Depression Screening and Follow-up Plan: Patient was screened for depression during today's encounter. They screened negative with a PHQ-9 score of 3.        History of Present Illness     Adult Annual Physical:  Patient presents for annual physical.     Diet and  "Physical Activity:  - Diet/Nutrition: well balanced diet.  - Exercise: moderate cardiovascular exercise, 1-2 times a week on average and 30-60 minutes on average.    Depression Screening:    - PHQ-9 Score: 3    General Health:  - Sleep: sleeps well and 7-8 hours of sleep on average.  - Hearing: normal hearing bilateral ears.  - Vision: no vision problems and goes for regular eye exams.  - Dental: brushes teeth twice daily and regular dental visits.    /GYN Health:  - Follows with GYN: yes.   - Menopause: premenopausal.   - History of STDs: no    Advanced Care Planning:  - Has an advanced directive?: no    - Has a durable medical POA?: no      Review of Systems      Objective     /86   Pulse 100   Ht 5' 4.75\" (1.645 m)   Wt 83 kg (183 lb)   SpO2 100%   BMI 30.69 kg/m²     Physical Exam    "

## 2024-09-26 NOTE — PROGRESS NOTES
"Assessment/Plan:    No problem-specific Assessment & Plan notes found for this encounter.       Diagnoses and all orders for this visit:    Screening mammogram for breast cancer  -     Mammo screening bilateral w 3d and cad; Future    Anxiety    Attention deficit hyperactivity disorder (ADHD), combined type    Bariatric surgery status    Wellness examination    Other orders  -     Ferrous Sulfate Dried (High Potency Iron) 65 MG TABS  -     vitamin B-12 (CYANOCOBALAMIN) 500 MCG TABS  -     Cholecalciferol (D3 2000) 50 MCG (2000 UT) CAPS  -     Calcium Citrate-Vitamin D3 (Calcium Citrate +D) 315-6.25 MG-MCG TABS          Subjective:   Chief Complaint   Patient presents with    Physical Exam     Will schedule Pap w/ OB/GYN.  Mammo order placed        Patient ID: Pee Khan is a 44 y.o. female.    HPI    The following portions of the patient's history were reviewed and updated as appropriate: allergies, current medications, past family history, past medical history, past social history, past surgical history and problem list.    Review of Systems   Constitutional:  Negative for fatigue, fever and unexpected weight change.   HENT:  Negative for congestion, sinus pain and sore throat.    Eyes:  Negative for visual disturbance.   Respiratory:  Negative for shortness of breath and wheezing.    Cardiovascular:  Negative for chest pain and palpitations.   Gastrointestinal:  Negative for abdominal pain, nausea and vomiting.   Musculoskeletal: Negative.  Negative for arthralgias and myalgias.   Neurological:  Negative for syncope, weakness and numbness.   Psychiatric/Behavioral: Negative.  Negative for confusion, dysphoric mood and suicidal ideas.          Objective:  Vitals:    09/26/24 0729   BP: 128/86   Pulse: 100   SpO2: 100%   Weight: 83 kg (183 lb)   Height: 5' 4.75\" (1.645 m)      Physical Exam  Constitutional:       Appearance: She is well-developed.   HENT:      Right Ear: Ear canal normal. Tympanic membrane " is not injected.      Left Ear: Ear canal normal. Tympanic membrane is not injected.      Nose: Nose normal.   Eyes:      General:         Right eye: No discharge.         Left eye: No discharge.      Conjunctiva/sclera: Conjunctivae normal.      Pupils: Pupils are equal, round, and reactive to light.   Neck:      Thyroid: No thyromegaly.   Cardiovascular:      Rate and Rhythm: Normal rate and regular rhythm.      Heart sounds: Normal heart sounds. No murmur heard.  Pulmonary:      Effort: Pulmonary effort is normal. No respiratory distress.      Breath sounds: Normal breath sounds. No wheezing.   Abdominal:      General: Bowel sounds are normal. There is no distension.      Palpations: Abdomen is soft.      Tenderness: There is no abdominal tenderness.   Musculoskeletal:         General: Normal range of motion.      Cervical back: Normal range of motion and neck supple.   Lymphadenopathy:      Cervical: No cervical adenopathy.   Skin:     General: Skin is warm and dry.   Neurological:      Mental Status: She is alert and oriented to person, place, and time. She is not disoriented.      Sensory: No sensory deficit.      Motor: No weakness.      Coordination: Coordination normal.      Gait: Gait normal.      Deep Tendon Reflexes: Reflexes are normal and symmetric.   Psychiatric:         Speech: Speech normal.         Behavior: Behavior normal.         Thought Content: Thought content normal.         Judgment: Judgment normal.

## 2024-10-10 LAB
25(OH)D3+25(OH)D2 SERPL-MCNC: 74.5 NG/ML (ref 30–100)
ALBUMIN SERPL-MCNC: 4.2 G/DL (ref 3.9–4.9)
ALP SERPL-CCNC: 51 IU/L (ref 44–121)
ALT SERPL-CCNC: 20 IU/L (ref 0–32)
AST SERPL-CCNC: 24 IU/L (ref 0–40)
BASOPHILS # BLD AUTO: 0.1 X10E3/UL (ref 0–0.2)
BASOPHILS NFR BLD AUTO: 1 %
BILIRUB SERPL-MCNC: 0.6 MG/DL (ref 0–1.2)
BUN SERPL-MCNC: 7 MG/DL (ref 6–24)
BUN/CREAT SERPL: 10 (ref 9–23)
CALCIUM SERPL-MCNC: 9.4 MG/DL (ref 8.7–10.2)
CHLORIDE SERPL-SCNC: 104 MMOL/L (ref 96–106)
CHOLEST SERPL-MCNC: 148 MG/DL (ref 100–199)
CO2 SERPL-SCNC: 23 MMOL/L (ref 20–29)
CREAT SERPL-MCNC: 0.72 MG/DL (ref 0.57–1)
EGFR: 106 ML/MIN/1.73
EOSINOPHIL # BLD AUTO: 0.5 X10E3/UL (ref 0–0.4)
EOSINOPHIL NFR BLD AUTO: 8 %
ERYTHROCYTE [DISTWIDTH] IN BLOOD BY AUTOMATED COUNT: 11.7 % (ref 11.7–15.4)
FERRITIN SERPL-MCNC: 103 NG/ML (ref 15–150)
GLOBULIN SER-MCNC: 2.4 G/DL (ref 1.5–4.5)
GLUCOSE SERPL-MCNC: 100 MG/DL (ref 70–99)
HCT VFR BLD AUTO: 39.8 % (ref 34–46.6)
HDLC SERPL-MCNC: 63 MG/DL
HGB BLD-MCNC: 13.4 G/DL (ref 11.1–15.9)
IMM GRANULOCYTES # BLD: 0 X10E3/UL (ref 0–0.1)
IMM GRANULOCYTES NFR BLD: 0 %
LDLC SERPL CALC-MCNC: 72 MG/DL (ref 0–99)
LDLC/HDLC SERPL: 1.1 RATIO (ref 0–3.2)
LYMPHOCYTES # BLD AUTO: 1.8 X10E3/UL (ref 0.7–3.1)
LYMPHOCYTES NFR BLD AUTO: 27 %
MCH RBC QN AUTO: 32.5 PG (ref 26.6–33)
MCHC RBC AUTO-ENTMCNC: 33.7 G/DL (ref 31.5–35.7)
MCV RBC AUTO: 97 FL (ref 79–97)
MONOCYTES # BLD AUTO: 0.3 X10E3/UL (ref 0.1–0.9)
MONOCYTES NFR BLD AUTO: 5 %
NEUTROPHILS # BLD AUTO: 3.9 X10E3/UL (ref 1.4–7)
NEUTROPHILS NFR BLD AUTO: 59 %
PLATELET # BLD AUTO: 295 X10E3/UL (ref 150–450)
POTASSIUM SERPL-SCNC: 4.7 MMOL/L (ref 3.5–5.2)
PROT SERPL-MCNC: 6.6 G/DL (ref 6–8.5)
RBC # BLD AUTO: 4.12 X10E6/UL (ref 3.77–5.28)
SL AMB VLDL CHOLESTEROL CALC: 13 MG/DL (ref 5–40)
SODIUM SERPL-SCNC: 142 MMOL/L (ref 134–144)
TRIGL SERPL-MCNC: 66 MG/DL (ref 0–149)
VIT B12 SERPL-MCNC: 1479 PG/ML (ref 232–1245)
WBC # BLD AUTO: 6.6 X10E3/UL (ref 3.4–10.8)

## 2024-10-17 ENCOUNTER — HOSPITAL ENCOUNTER (OUTPATIENT)
Dept: MAMMOGRAPHY | Facility: IMAGING CENTER | Age: 44
Discharge: HOME | End: 2024-10-17
Payer: COMMERCIAL

## 2024-10-17 VITALS — BODY MASS INDEX: 30.49 KG/M2 | WEIGHT: 183 LBS | HEIGHT: 65 IN

## 2024-10-17 DIAGNOSIS — Z12.31 SCREENING MAMMOGRAM FOR BREAST CANCER: ICD-10-CM

## 2024-10-17 PROCEDURE — 77063 BREAST TOMOSYNTHESIS BI: CPT

## 2024-10-17 PROCEDURE — 77067 SCR MAMMO BI INCL CAD: CPT

## 2024-10-25 DIAGNOSIS — F90.2 ATTENTION DEFICIT HYPERACTIVITY DISORDER (ADHD), COMBINED TYPE: ICD-10-CM

## 2024-10-25 RX ORDER — DEXTROAMPHETAMINE SACCHARATE, AMPHETAMINE ASPARTATE, DEXTROAMPHETAMINE SULFATE AND AMPHETAMINE SULFATE 5; 5; 5; 5 MG/1; MG/1; MG/1; MG/1
20 TABLET ORAL
Qty: 60 TABLET | Refills: 0 | Status: SHIPPED | OUTPATIENT
Start: 2024-10-25

## 2024-11-26 DIAGNOSIS — F90.2 ATTENTION DEFICIT HYPERACTIVITY DISORDER (ADHD), COMBINED TYPE: ICD-10-CM

## 2024-11-27 RX ORDER — DEXTROAMPHETAMINE SACCHARATE, AMPHETAMINE ASPARTATE, DEXTROAMPHETAMINE SULFATE AND AMPHETAMINE SULFATE 5; 5; 5; 5 MG/1; MG/1; MG/1; MG/1
20 TABLET ORAL
Qty: 60 TABLET | Refills: 0 | Status: SHIPPED | OUTPATIENT
Start: 2024-11-27

## 2024-12-26 DIAGNOSIS — F90.2 ATTENTION DEFICIT HYPERACTIVITY DISORDER (ADHD), COMBINED TYPE: ICD-10-CM

## 2024-12-26 RX ORDER — DEXTROAMPHETAMINE SACCHARATE, AMPHETAMINE ASPARTATE, DEXTROAMPHETAMINE SULFATE AND AMPHETAMINE SULFATE 5; 5; 5; 5 MG/1; MG/1; MG/1; MG/1
20 TABLET ORAL
Qty: 60 TABLET | Refills: 0 | Status: SHIPPED | OUTPATIENT
Start: 2024-12-26

## 2025-02-05 DIAGNOSIS — F90.2 ATTENTION DEFICIT HYPERACTIVITY DISORDER (ADHD), COMBINED TYPE: ICD-10-CM

## 2025-02-06 RX ORDER — DEXTROAMPHETAMINE SACCHARATE, AMPHETAMINE ASPARTATE, DEXTROAMPHETAMINE SULFATE AND AMPHETAMINE SULFATE 5; 5; 5; 5 MG/1; MG/1; MG/1; MG/1
20 TABLET ORAL
Qty: 60 TABLET | Refills: 0 | Status: SHIPPED | OUTPATIENT
Start: 2025-02-06

## 2025-02-21 ENCOUNTER — APPOINTMENT (OUTPATIENT)
Dept: LAB | Facility: HOSPITAL | Age: 45
End: 2025-02-21

## 2025-02-21 DIAGNOSIS — Z00.6 ENCOUNTER FOR EXAMINATION FOR NORMAL COMPARISON OR CONTROL IN CLINICAL RESEARCH PROGRAM: ICD-10-CM

## 2025-02-21 PROCEDURE — 36415 COLL VENOUS BLD VENIPUNCTURE: CPT

## 2025-03-06 LAB
APOB+LDLR+PCSK9 GENE MUT ANL BLD/T: NOT DETECTED
BRCA1+BRCA2 DEL+DUP + FULL MUT ANL BLD/T: NOT DETECTED
MLH1+MSH2+MSH6+PMS2 GN DEL+DUP+FUL M: NOT DETECTED

## 2025-03-07 DIAGNOSIS — F90.2 ATTENTION DEFICIT HYPERACTIVITY DISORDER (ADHD), COMBINED TYPE: ICD-10-CM

## 2025-03-10 RX ORDER — DEXTROAMPHETAMINE SACCHARATE, AMPHETAMINE ASPARTATE, DEXTROAMPHETAMINE SULFATE AND AMPHETAMINE SULFATE 5; 5; 5; 5 MG/1; MG/1; MG/1; MG/1
20 TABLET ORAL
Qty: 60 TABLET | Refills: 0 | Status: SHIPPED | OUTPATIENT
Start: 2025-03-10

## 2025-04-01 ENCOUNTER — TELEMEDICINE (OUTPATIENT)
Dept: FAMILY MEDICINE CLINIC | Facility: CLINIC | Age: 45
End: 2025-04-01
Payer: COMMERCIAL

## 2025-04-01 DIAGNOSIS — F90.2 ATTENTION DEFICIT HYPERACTIVITY DISORDER (ADHD), COMBINED TYPE: Primary | ICD-10-CM

## 2025-04-01 PROCEDURE — 98005 SYNCH AUDIO-VIDEO EST LOW 20: CPT | Performed by: FAMILY MEDICINE

## 2025-04-01 NOTE — PROGRESS NOTES
Virtual Regular VisitName: Pee Khan      : 1980      MRN: [id number]  Encounter Provider: Cj San MD  Encounter Date: 2025   Encounter department: [facility]  :  Assessment & Plan  Attention deficit hyperactivity disorder (ADHD), combined type  Patient tested high on the in-office ADD screen. Patient meets DSM-5 criteria for ADD. Patient assessed and has low abuse/misuse potential based off social and family history. Patient counseled on risks. PDMP site reviewed.                History of Present Illness     HPI  Review of Systems   Constitutional:  Negative for fatigue, fever and unexpected weight change.   HENT:  Negative for congestion, sinus pain and sore throat.    Eyes:  Negative for visual disturbance.   Respiratory:  Negative for shortness of breath and wheezing.    Cardiovascular:  Negative for chest pain and palpitations.   Gastrointestinal:  Negative for abdominal pain, nausea and vomiting.   Musculoskeletal: Negative.  Negative for arthralgias and myalgias.   Neurological:  Negative for syncope, weakness and numbness.   Psychiatric/Behavioral: Negative.  Negative for confusion, dysphoric mood and suicidal ideas.        Objective   There were no vitals taken for this visit.    Physical Exam  Pulmonary:      Effort: Pulmonary effort is normal.   Neurological:      General: No focal deficit present.   Psychiatric:         Mood and Affect: Mood normal.         Administrative Statements   Encounter provider Cj San MD    The Patient is located at Home and in the following state in which I hold an active license [place].    The patient was identified by name and date of birth. Pee Khan was informed that this is a telemedicine visit and that the visit is being conducted through the Epic Embedded platform. She agrees to proceed..  My office door was closed. No one else was in the room.  She acknowledged consent and understanding of  privacy and security of the video platform. The patient has agreed to participate and understands they can discontinue the visit at any time.    I have spent a total time of 15 minutes in caring for this patient on the day of the visit/encounter including Diagnostic results, not including the time spent for establishing the audio/video connection.

## 2025-04-01 NOTE — ASSESSMENT & PLAN NOTE
Patient tested high on the in-office ADD screen. Patient meets DSM-5 criteria for ADD. Patient assessed and has low abuse/misuse potential based off social and family history. Patient counseled on risks. PDMP site reviewed.

## 2025-04-02 DIAGNOSIS — F41.9 ANXIETY: ICD-10-CM

## 2025-04-03 RX ORDER — VORTIOXETINE 20 MG/1
20 TABLET, FILM COATED ORAL DAILY
Qty: 90 TABLET | Refills: 1 | Status: SHIPPED | OUTPATIENT
Start: 2025-04-03

## 2025-04-25 DIAGNOSIS — F90.2 ATTENTION DEFICIT HYPERACTIVITY DISORDER (ADHD), COMBINED TYPE: ICD-10-CM

## 2025-04-29 RX ORDER — DEXTROAMPHETAMINE SACCHARATE, AMPHETAMINE ASPARTATE, DEXTROAMPHETAMINE SULFATE AND AMPHETAMINE SULFATE 5; 5; 5; 5 MG/1; MG/1; MG/1; MG/1
20 TABLET ORAL
Qty: 60 TABLET | Refills: 0 | Status: SHIPPED | OUTPATIENT
Start: 2025-04-29

## 2025-06-03 DIAGNOSIS — F90.2 ATTENTION DEFICIT HYPERACTIVITY DISORDER (ADHD), COMBINED TYPE: ICD-10-CM

## 2025-06-03 RX ORDER — DEXTROAMPHETAMINE SACCHARATE, AMPHETAMINE ASPARTATE, DEXTROAMPHETAMINE SULFATE AND AMPHETAMINE SULFATE 5; 5; 5; 5 MG/1; MG/1; MG/1; MG/1
20 TABLET ORAL
Qty: 60 TABLET | Refills: 0 | Status: CANCELLED | OUTPATIENT
Start: 2025-06-03

## 2025-06-25 DIAGNOSIS — F41.9 ANXIETY: ICD-10-CM

## (undated) DEVICE — GAUZE SPONGES,16 PLY: Brand: CURITY

## (undated) DEVICE — 3000CC GUARDIAN II: Brand: GUARDIAN

## (undated) DEVICE — PMI DISPOSABLE PUNCTURE CLOSURE DEVICE / SUTURE GRASPER: Brand: PMI

## (undated) DEVICE — CHLORAPREP HI-LITE 26ML ORANGE

## (undated) DEVICE — ANTI-FOG SOLUTION WITH FOAM PAD: Brand: DEVON

## (undated) DEVICE — TROCAR: Brand: KII FIOS FIRST ENTRY

## (undated) DEVICE — LIGACLIP 10-M/L, 10MM ENDOSCOPIC ROTATING MULTIPLE CLIP APPLIERS: Brand: LIGACLIP

## (undated) DEVICE — GLOVE SRG BIOGEL 7.5

## (undated) DEVICE — GLOVE INDICATOR PI UNDERGLOVE SZ 7 BLUE

## (undated) DEVICE — BLUE HEAT SCOPE WARMER

## (undated) DEVICE — TUBING SMOKE EVAC W/FILTRATION DEVICE PLUMEPORT ACTIV

## (undated) DEVICE — ENDOPOUCH RETRIEVER SPECIMEN RETRIEVAL BAGS: Brand: ENDOPOUCH RETRIEVER

## (undated) DEVICE — LAPAROSCOPIC TROCAR SLEEVE/SINGLE USE: Brand: KII® SLEEVE

## (undated) DEVICE — [HIGH FLOW INSUFFLATOR,  DO NOT USE IF PACKAGE IS DAMAGED,  KEEP DRY,  KEEP AWAY FROM SUNLIGHT,  PROTECT FROM HEAT AND RADIOACTIVE SOURCES.]: Brand: PNEUMOSURE

## (undated) DEVICE — SCD SEQUENTIAL COMPRESSION COMFORT SLEEVE MEDIUM KNEE LENGTH: Brand: KENDALL SCD

## (undated) DEVICE — INTENDED FOR TISSUE SEPARATION, AND OTHER PROCEDURES THAT REQUIRE A SHARP SURGICAL BLADE TO PUNCTURE OR CUT.: Brand: BARD-PARKER SAFETY BLADES SIZE 15, STERILE

## (undated) DEVICE — BAG DECANTER

## (undated) DEVICE — SUT MONOCRYL 4-0 PS-2 27 IN Y426H

## (undated) DEVICE — ENDOPATH 5MM CURVED SCISSORS WITH MONOPOLAR CAUTERY: Brand: ENDOPATH

## (undated) DEVICE — VIOLET BRAIDED (POLYGLACTIN 910), SYNTHETIC ABSORBABLE SUTURE: Brand: COATED VICRYL

## (undated) DEVICE — SYRINGE 20ML LL

## (undated) DEVICE — BETHLEHEM UNIVERSAL MINOR GEN: Brand: CARDINAL HEALTH

## (undated) DEVICE — HARMONIC ACE +7 LAPAROSCOPIC SHEARS ADVANCED HEMOSTASIS 5MM DIAMETER 36CM SHAFT LENGTH  FOR USE WITH GRAY HAND PIECE ONLY: Brand: HARMONIC ACE

## (undated) DEVICE — WEBRIL 6 IN UNSTERILE

## (undated) DEVICE — TUBING SUCTION 5MM X 12 FT

## (undated) DEVICE — PBT NON ABSORBABLE WOUND CLOSURE DEVICE: Brand: V-LOC

## (undated) DEVICE — GAUZE SPONGES,USP TYPE VII GAUZE, 12 PLY: Brand: CURITY

## (undated) DEVICE — SYRINGE 30ML LL

## (undated) DEVICE — STAPLER GIA HANDLE STAND 4MM

## (undated) DEVICE — Device: Brand: DEFENDO AIR/WATER/SUCTION AND BIOPSY VALVE

## (undated) DEVICE — TROCARS: Brand: KII® BALLOON BLUNT TIP SYSTEM

## (undated) DEVICE — STAPLER EEA 25 MM COVIDIEN

## (undated) DEVICE — 10 MM BABCOCKS WITH RATCHET HANDLES: Brand: ENDOPATH

## (undated) DEVICE — 2000CC GUARDIAN II: Brand: GUARDIAN

## (undated) DEVICE — IRRIG ENDO FLO TUBING

## (undated) DEVICE — GLOVE SRG BIOGEL 7

## (undated) DEVICE — COVIDIEN ENDO GIA PURPLE (MED) RELOAD 45MM

## (undated) DEVICE — ADHESIVE SKIN CLSR DERMABOND NX

## (undated) DEVICE — ENDOPATH PNEUMONEEDLE INSUFFLATION NEEDLES WITH LUER LOCK CONNECTORS 120MM: Brand: ENDOPATH

## (undated) DEVICE — TISSUE RETRIEVAL SYSTEM: Brand: INZII RETRIEVAL SYSTEM

## (undated) DEVICE — ELECTRODE LAP SPATULA STR E-Z CLEAN 33CM -0018

## (undated) DEVICE — TROCAR: Brand: KII® SLEEVE

## (undated) DEVICE — DRAPE EQUIPMENT RF WAND

## (undated) DEVICE — NEEDLE SPINAL18G X 3.5 IN QUINCKE

## (undated) DEVICE — SURGICAL GOWN, XL SMARTSLEEVE: Brand: CONVERTORS

## (undated) DEVICE — DRAPE TOWEL: Brand: CONVERTORS

## (undated) DEVICE — URETERAL CATHETER ADAPTOR TIP

## (undated) DEVICE — PENCIL ELECTROSURG E-Z CLEAN -0035H

## (undated) DEVICE — VIAL DECANTER

## (undated) DEVICE — STAPLER ENDO GIA ROTICULATOR 60-2.5

## (undated) DEVICE — ABSORBABLE WOUND CLOSURE DEVICE: Brand: V-LOC 90

## (undated) DEVICE — TIBURON LAPAROSCOPIC ABDOMINAL DRAPE: Brand: CONVERTORS

## (undated) DEVICE — INSUFFLATION NEEDLE TO ESTABLISH PNEUMOPERITONEUM.: Brand: INSUFFLATION NEEDLE

## (undated) DEVICE — 3M™ DURAPORE™ SURGICAL TAPE 1538-3, 3 INCH X 10 YARD (7,5CM X 9,1M), 4 ROLLS/BOX: Brand: 3M™ DURAPORE™

## (undated) DEVICE — 3M™ TEGADERM™ TRANSPARENT FILM DRESSING FRAME STYLE, 1626W, 4 IN X 4-3/4 IN (10 CM X 12 CM), 50/CT 4CT/CASE: Brand: 3M™ TEGADERM™

## (undated) DEVICE — TRAVELKIT CONTAINS FIRST STEP KIT (200ML EP-4 KIT) AND SOILED SCOPE BAG - 1 KIT: Brand: TRAVELKIT CONTAINS FIRST STEP KIT AND SOILED SCOPE BAG

## (undated) DEVICE — Device

## (undated) DEVICE — 5 MM CURVED DISSECTORS WITH MONOPOLAR CAUTERY: Brand: ENDOPATH

## (undated) DEVICE — ASTOUND STANDARD SURGICAL GOWN, XL: Brand: CONVERTORS

## (undated) DEVICE — GLOVE PI ULTRA TOUCH SZ.6.5

## (undated) DEVICE — COVIDIEN ENDO GIA PURPLE (MED) RELOAD 60MM

## (undated) DEVICE — PACK PBDS LAP CHOLE RF

## (undated) DEVICE — ELECTRODE LAP L WIRE E-Z CLEAN 33CM -0100